# Patient Record
Sex: MALE | Race: WHITE | NOT HISPANIC OR LATINO | Employment: FULL TIME | URBAN - METROPOLITAN AREA
[De-identification: names, ages, dates, MRNs, and addresses within clinical notes are randomized per-mention and may not be internally consistent; named-entity substitution may affect disease eponyms.]

---

## 2017-01-10 ENCOUNTER — OFFICE VISIT (OUTPATIENT)
Dept: FAMILY MEDICINE CLINIC | Facility: CLINIC | Age: 32
End: 2017-01-10

## 2017-01-10 VITALS
SYSTOLIC BLOOD PRESSURE: 114 MMHG | TEMPERATURE: 98 F | OXYGEN SATURATION: 97 % | RESPIRATION RATE: 16 BRPM | DIASTOLIC BLOOD PRESSURE: 82 MMHG | BODY MASS INDEX: 28.45 KG/M2 | HEIGHT: 66 IN | HEART RATE: 79 BPM | WEIGHT: 177 LBS

## 2017-01-10 DIAGNOSIS — J06.9 ACUTE URI: Primary | ICD-10-CM

## 2017-01-10 PROCEDURE — 99213 OFFICE O/P EST LOW 20 MIN: CPT | Performed by: NURSE PRACTITIONER

## 2017-01-10 RX ORDER — PROMETHAZINE HYDROCHLORIDE AND CODEINE PHOSPHATE 6.25; 1 MG/5ML; MG/5ML
5 SYRUP ORAL EVERY 4 HOURS PRN
Qty: 180 ML | Refills: 0 | Status: SHIPPED | OUTPATIENT
Start: 2017-01-10 | End: 2018-04-17

## 2017-01-10 NOTE — MR AVS SNAPSHOT
Santino Rizzo   1/10/2017 9:40 AM   Office Visit    Dept Phone:  [phone]   Encounter #:  [id number]    Provider:  VITALIY Gutiérrez   Department:  [facility]                Your Full Care Plan              Today's Medication Changes          These changes are accurate as of: 1/10/17 11:16 AM.  If you have any questions, ask your nurse or doctor.               New Medication(s)Ordered:     promethazine-codeine 6.25-10 MG/5ML syrup   Commonly known as:  PHENERGAN with CODEINE   Take 5 mL by mouth Every 4 (Four) Hours As Needed for cough.   Started by:  VITALIY Gutiérrez            Where to Get Your Medications      You can get these medications from any pharmacy     Bring a paper prescription for each of these medications     promethazine-codeine 6.25-10 MG/5ML syrup                  Your Updated Medication List          This list is accurate as of: 1/10/17 11:16 AM.  Always use your most recent med list.                cetirizine 10 MG tablet   Commonly known as:  zyrTEC       promethazine-codeine 6.25-10 MG/5ML syrup   Commonly known as:  PHENERGAN with CODEINE   Take 5 mL by mouth Every 4 (Four) Hours As Needed for cough.               You Were Diagnosed With        Codes Comments    Acute URI    -  Primary ICD-10-CM: J06.9  ICD-9-CM: 465.9       Instructions     None    Patient Instructions History      Upcoming Appointments     Visit Type Date Time Department    SAME DAY 1/10/2017  9:40 AM [facility]      [employer] Signup     Our records indicate that you have an active [employer] account.    You can view your After Visit Summary by going to [employer] and logging in with your [employer] username and password.  If you don't have a [employer] username and password but a parent or guardian has access to your record, the parent or guardian should login with their own [employer] username and password and access  "your record to view the After Visit Summary.    If you have questions, you can email [email or web address] or call [phone] to talk to our MyChart staff.  Remember, [email or web address] is NOT to be used for urgent needs.  For medical emergencies, dial 911.               Other Info from Your Visit           Allergies     No Known Allergies      Reason for Visit     Cough pt states mucus green     Nasal Congestion           Vital Signs     Blood Pressure Pulse Temperature Respirations Height Weight    114/82 79 98 °F (36.7 °C) (Oral) 16 66\" (167.6 cm) 177 lb (80.3 kg)    Oxygen Saturation Body Mass Index Smoking Status             97% 28.57 kg/m2 Never Smoker         Problems and Diagnoses Noted     Acute upper respiratory infection    -  Primary        "

## 2017-01-10 NOTE — PROGRESS NOTES
Subjective   Santino Rizzo is a 31 y.o. male.   Chief Complaint   Patient presents with   • Cough     pt states mucus green    • Nasal Congestion     Vitals:    01/10/17 0929   BP: 114/82   Pulse: 79   Resp: 16   Temp: 98 °F (36.7 °C)   SpO2: 97%     No LMP for male patient.    History of Present Illness  Santino is here for an acute visit. He c/o productive cough with occasional sputum, sore throat, and nasal congestion that started a week ago. He denies fever, chills, or body aches. He has taken nyquil otc with some relief. Overall he feels better but he has had a persistent cough which has caused him to have some heartburn. He has not taken anything for the heartburn symptoms. He denies CP, or SOA     The following portions of the patient's history were reviewed and updated as appropriate: allergies, current medications, past family history, past medical history, past social history, past surgical history and problem list.    Review of Systems   Constitutional: Negative for chills, fatigue and fever.   HENT: Positive for congestion, postnasal drip, rhinorrhea and sore throat.    Respiratory: Positive for cough (occasionally productive ). Negative for chest tightness, shortness of breath and wheezing.    Cardiovascular: Negative for chest pain and palpitations.   Gastrointestinal:        Heartburn.        Objective   Physical Exam   Constitutional: Vital signs are normal. He appears well-developed and well-nourished. No distress.   HENT:   Right Ear: Tympanic membrane and ear canal normal.   Left Ear: Tympanic membrane and ear canal normal.   Nose: Mucosal edema and rhinorrhea present.   Mouth/Throat: Uvula is midline. Posterior oropharyngeal erythema present.   Cardiovascular: Normal rate and regular rhythm.    Pulmonary/Chest: Effort normal and breath sounds normal.   Neurological: He is alert.   Skin: Skin is warm and dry.   Psychiatric: He has a normal mood and affect. His speech is normal and behavior is  normal. Cognition and memory are normal.       Assessment/Plan   Santino was seen today for cough and nasal congestion.    Diagnoses and all orders for this visit:    Acute URI    Other orders  -     promethazine-codeine (PHENERGAN with CODEINE) 6.25-10 MG/5ML syrup; Take 5 mL by mouth Every 4 (Four) Hours As Needed for cough.      Symptom treatment for 7-10 days  Rest and fluids  Tylenol or motrin   Avoid second hand smoke and allergens   No driving while taking promethazine codeine as it can cause drowsiness   Throat lozenges, humidifier, vicks vapor rub as needed  Follow up if your symptoms persist past 7-10 days or sooner if your symptoms worsen or if you develop new symptoms

## 2018-04-17 ENCOUNTER — OFFICE VISIT (OUTPATIENT)
Dept: FAMILY MEDICINE CLINIC | Facility: CLINIC | Age: 33
End: 2018-04-17

## 2018-04-17 VITALS
HEART RATE: 83 BPM | BODY MASS INDEX: 28.28 KG/M2 | HEIGHT: 66 IN | RESPIRATION RATE: 18 BRPM | SYSTOLIC BLOOD PRESSURE: 124 MMHG | WEIGHT: 176 LBS | DIASTOLIC BLOOD PRESSURE: 80 MMHG

## 2018-04-17 DIAGNOSIS — M25.511 ACUTE PAIN OF BOTH SHOULDERS: Primary | ICD-10-CM

## 2018-04-17 DIAGNOSIS — M25.512 ACUTE PAIN OF BOTH SHOULDERS: Primary | ICD-10-CM

## 2018-04-17 PROCEDURE — 99213 OFFICE O/P EST LOW 20 MIN: CPT | Performed by: INTERNAL MEDICINE

## 2018-04-17 RX ORDER — MELOXICAM 7.5 MG/1
7.5 TABLET ORAL DAILY
Qty: 30 TABLET | Refills: 1 | Status: SHIPPED | OUTPATIENT
Start: 2018-04-17 | End: 2018-08-01

## 2018-04-17 NOTE — PROGRESS NOTES
Subjective   Santino Rizzo is a 33 y.o. male. Patient is here today for   Chief Complaint   Patient presents with   • Shoulder Pain     both shoulders. Left shoulder worse x 1 month - NKI           Vitals:    04/17/18 1429   BP: 124/80   Pulse: 83   Resp: 18     The following portions of the patient's history were reviewed and updated as appropriate: allergies, current medications, past family history, past medical history, past social history, past surgical history and problem list.    Past Medical History:   Diagnosis Date   • Environmental allergies    • Pilonidal cyst    • Spinal headache     NA      No Known Allergies   Social History     Social History   • Marital status: Single     Spouse name: N/A   • Number of children: N/A   • Years of education: N/A     Occupational History   •       Social History Main Topics   • Smoking status: Never Smoker   • Smokeless tobacco: Never Used   • Alcohol use 1.2 oz/week     2 Shots of liquor per week      Comment: moderate   • Drug use: No   • Sexual activity: Not on file     Other Topics Concern   • Not on file     Social History Narrative   • No narrative on file        Current Outpatient Prescriptions:   •  cetirizine (ZyrTEC) 10 MG tablet, Take 10 mg by mouth as needed for allergies., Disp: , Rfl:   •  meloxicam (MOBIC) 7.5 MG tablet, Take 1 tablet by mouth Daily., Disp: 30 tablet, Rfl: 1     Objective     History of Present Illness Santino complains of bilateral shoulder pain that worsened over the last month or so.  The pain is worse on the left.  He denies any injury.  He has pain when he lifts his arms above horizontal.  He plays volleyball and also has some sharp pains with certain movements.  He played baseball for a lot of years.  He does have some pain at night.  He has not taken any medicine to relieve the pain.    Review of Systems   Constitutional: Negative for activity change.   Musculoskeletal: Negative for neck pain.       Physical Exam    Constitutional: He appears well-developed and well-nourished.   Musculoskeletal:   Shoulder posture is good.  Both shoulders have normal range of motion.  There is some crepitus of the left acromioclavicular joint.  There are positive supraspinatus signs on the left.   Psychiatric: He has a normal mood and affect.   Vitals reviewed.      ASSESSMENT     Problem List Items Addressed This Visit        Nervous and Auditory    Acute pain of both shoulders - Primary      Other Visit Diagnoses    None.         PLAN  Patient Instructions   Start home physical therapy per instruction sheet for rotator cuff tendinitis.  Start meloxicam 7.5 mg daily with food.  Discussed potential GI side effects.  If symptoms do not improve we'll refer to physical therapy or orthopedic surgery.    Return if symptoms worsen or fail to improve.

## 2018-04-17 NOTE — PATIENT INSTRUCTIONS
Start home physical therapy per instruction sheet for rotator cuff tendinitis.  Start meloxicam 7.5 mg daily with food.  Discussed potential GI side effects.  If symptoms do not improve we'll refer to physical therapy or orthopedic surgery.

## 2018-08-01 ENCOUNTER — OFFICE VISIT (OUTPATIENT)
Dept: FAMILY MEDICINE CLINIC | Facility: CLINIC | Age: 33
End: 2018-08-01

## 2018-08-01 VITALS
BODY MASS INDEX: 27 KG/M2 | HEART RATE: 62 BPM | DIASTOLIC BLOOD PRESSURE: 70 MMHG | SYSTOLIC BLOOD PRESSURE: 114 MMHG | WEIGHT: 168 LBS | RESPIRATION RATE: 18 BRPM | HEIGHT: 66 IN

## 2018-08-01 DIAGNOSIS — S46.812A STRAIN OF LEFT TRAPEZIUS MUSCLE, INITIAL ENCOUNTER: ICD-10-CM

## 2018-08-01 DIAGNOSIS — M54.2 NECK PAIN ON LEFT SIDE: Primary | ICD-10-CM

## 2018-08-01 PROCEDURE — 99213 OFFICE O/P EST LOW 20 MIN: CPT | Performed by: INTERNAL MEDICINE

## 2018-08-01 RX ORDER — METAXALONE 800 MG/1
800 TABLET ORAL 3 TIMES DAILY
Qty: 30 TABLET | Refills: 0 | Status: SHIPPED | OUTPATIENT
Start: 2018-08-01 | End: 2018-09-05

## 2018-08-01 NOTE — PROGRESS NOTES
Subjective   Santino Rizzo is a 33 y.o. male. Patient is here today for   Chief Complaint   Patient presents with   • Neck Pain     hitting at batting cage, possible pulled muscle           Vitals:    08/01/18 1127   BP: 114/70   Pulse: 62   Resp: 18     The following portions of the patient's history were reviewed and updated as appropriate: allergies, current medications, past family history, past medical history, past social history, past surgical history and problem list.    Past Medical History:   Diagnosis Date   • Environmental allergies    • Pilonidal cyst    • Spinal headache     NA      No Known Allergies   Social History     Social History   • Marital status: Single     Spouse name: N/A   • Number of children: N/A   • Years of education: N/A     Occupational History   •       Social History Main Topics   • Smoking status: Never Smoker   • Smokeless tobacco: Never Used   • Alcohol use 1.2 oz/week     2 Shots of liquor per week      Comment: moderate   • Drug use: No   • Sexual activity: Not on file     Other Topics Concern   • Not on file     Social History Narrative   • No narrative on file        Current Outpatient Prescriptions:   •  cetirizine (ZyrTEC) 10 MG tablet, Take 10 mg by mouth as needed for allergies., Disp: , Rfl:   •  metaxalone (SKELAXIN) 800 MG tablet, Take 1 tablet by mouth 3 (Three) Times a Day., Disp: 30 tablet, Rfl: 0     Objective     History of Present Illness Santino complains of left sided upper back pain that started after he was batting and a batting cage.  The pain was sharp and initially quite severe.  The pain is worse with certain head movements and shoulder movements.  He denies neck pain or any radicular symptoms.  He denies any arm weakness or numbness.  He is having a lot of pain at night and has having difficulty sleeping.  He has been taking ibuprofen.    Review of Systems   Constitutional: Positive for activity change.   Musculoskeletal: Positive for back  pain. Negative for neck pain.   Neurological: Negative for weakness and numbness.       Physical Exam   Constitutional: He appears well-developed and well-nourished.   Musculoskeletal:   There is point tenderness of the left trapezius muscle medial to the left scapula.   Neurological: He displays normal reflexes.   Skin: No rash noted.   Psychiatric: He has a normal mood and affect. His behavior is normal. Judgment and thought content normal.   Vitals reviewed.      ASSESSMENT     Problem List Items Addressed This Visit        Nervous and Auditory    RESOLVED: Neck pain on left side - Primary       Musculoskeletal and Integument    Strain of left trapezius muscle          PLAN  Patient Instructions   Symptoms and examination are consistent with trapezius or paraspinal muscle strain.  Suggest rest and moist heat.  Do neck range of motion exercises as well as some trapezius exercises.  Try Skelaxin 800 mg 3 times a day for a few days.  If no better we will refer to physical therapy.    No Follow-up on file.

## 2018-09-05 ENCOUNTER — OFFICE VISIT (OUTPATIENT)
Dept: FAMILY MEDICINE CLINIC | Facility: CLINIC | Age: 33
End: 2018-09-05

## 2018-09-05 VITALS
RESPIRATION RATE: 18 BRPM | DIASTOLIC BLOOD PRESSURE: 70 MMHG | HEART RATE: 69 BPM | WEIGHT: 168 LBS | BODY MASS INDEX: 27 KG/M2 | SYSTOLIC BLOOD PRESSURE: 122 MMHG | HEIGHT: 66 IN | TEMPERATURE: 98 F | OXYGEN SATURATION: 98 %

## 2018-09-05 DIAGNOSIS — H92.01 RIGHT EAR PAIN: Primary | ICD-10-CM

## 2018-09-05 DIAGNOSIS — H66.90 ACUTE OTITIS MEDIA, UNSPECIFIED OTITIS MEDIA TYPE: ICD-10-CM

## 2018-09-05 PROCEDURE — 99213 OFFICE O/P EST LOW 20 MIN: CPT | Performed by: INTERNAL MEDICINE

## 2018-09-05 RX ORDER — AZITHROMYCIN 250 MG/1
TABLET, FILM COATED ORAL
Qty: 6 TABLET | Refills: 0 | Status: SHIPPED | OUTPATIENT
Start: 2018-09-05 | End: 2018-09-28

## 2018-09-05 NOTE — PATIENT INSTRUCTIONS
Continue cetirizine 10 mg daily and take pseudoephedrine 30 mg decongestant tablets every 4-6 hours as needed.  Start azithromycin as instructed.

## 2018-09-05 NOTE — PROGRESS NOTES
Subjective   Santino Rizzo is a 33 y.o. male. Patient is here today for   Chief Complaint   Patient presents with   • Earache   • Cough          Vitals:    09/05/18 0837   BP: 122/70   Pulse: 69   Resp: 18   Temp: 98 °F (36.7 °C)   SpO2: 98%     The following portions of the patient's history were reviewed and updated as appropriate: allergies, current medications, past family history, past medical history, past social history, past surgical history and problem list.    Past Medical History:   Diagnosis Date   • Environmental allergies    • Pilonidal cyst    • Spinal headache     NA      No Known Allergies   Social History     Social History   • Marital status: Single     Spouse name: N/A   • Number of children: N/A   • Years of education: N/A     Occupational History   •       Social History Main Topics   • Smoking status: Never Smoker   • Smokeless tobacco: Never Used   • Alcohol use 1.2 oz/week     2 Shots of liquor per week      Comment: moderate   • Drug use: No   • Sexual activity: Not on file     Other Topics Concern   • Not on file     Social History Narrative   • No narrative on file        Current Outpatient Prescriptions:   •  cetirizine (ZyrTEC) 10 MG tablet, Take 10 mg by mouth as needed for allergies., Disp: , Rfl:   •  azithromycin (ZITHROMAX) 250 MG tablet, Take 2 tablets the first day, then 1 tablet daily for 4 days., Disp: 6 tablet, Rfl: 0     Objective     History of Present Illness Santino complains of right ear pain that started a couple days ago but got worse last night.  He has allergic rhinitis and is on cetirizine 10 mg daily.  He denies any fever.  He also has nasal and sinus congestion and a slight cough.    Review of Systems   Constitutional: Negative for fatigue and fever.   HENT: Positive for ear pain and sinus pressure. Negative for sore throat.    Respiratory: Positive for cough. Negative for shortness of breath and wheezing.        Physical Exam   Constitutional: He appears  well-developed and well-nourished.   HENT:   Right Ear: Ear canal normal. Tympanic membrane is erythematous and bulging. Tympanic membrane mobility is abnormal.   Left Ear: Ear canal normal. Tympanic membrane is not bulging.   Nose: Mucosal edema (on right) present.   Mouth/Throat: Oropharynx is clear and moist. No oropharyngeal exudate.   Pulmonary/Chest: Effort normal and breath sounds normal.   Lymphadenopathy:     He has no cervical adenopathy.   Psychiatric: He has a normal mood and affect. His behavior is normal. Judgment and thought content normal.   Vitals reviewed.      ASSESSMENT     Problem List Items Addressed This Visit        Nervous and Auditory    Right ear pain - Primary    Acute otitis media          PLAN  Patient Instructions   Continue cetirizine 10 mg daily and take pseudoephedrine 30 mg decongestant tablets every 4-6 hours as needed.  Start azithromycin as instructed.    Return if symptoms worsen or fail to improve.

## 2018-09-28 ENCOUNTER — OFFICE VISIT (OUTPATIENT)
Dept: FAMILY MEDICINE CLINIC | Facility: CLINIC | Age: 33
End: 2018-09-28

## 2018-09-28 VITALS
RESPIRATION RATE: 18 BRPM | WEIGHT: 161 LBS | HEART RATE: 80 BPM | BODY MASS INDEX: 25.88 KG/M2 | SYSTOLIC BLOOD PRESSURE: 128 MMHG | HEIGHT: 66 IN | DIASTOLIC BLOOD PRESSURE: 90 MMHG

## 2018-09-28 DIAGNOSIS — N48.89 SEBACEOUS CYST OF PENIS: Primary | ICD-10-CM

## 2018-09-28 PROCEDURE — 99213 OFFICE O/P EST LOW 20 MIN: CPT | Performed by: INTERNAL MEDICINE

## 2018-09-28 NOTE — PATIENT INSTRUCTIONS
Removed sebaceous material and pus from cyst with pressure and dressed with triple antibiotic ointment.

## 2018-09-28 NOTE — PROGRESS NOTES
Subjective   Santino Rizzo is a 33 y.o. male. Patient is here today for   Chief Complaint   Patient presents with   • Bump Penis     x 1 week           Vitals:    09/28/18 1349   BP: 128/90   Pulse: 80   Resp: 18     The following portions of the patient's history were reviewed and updated as appropriate: allergies, current medications, past family history, past medical history, past social history, past surgical history and problem list.    Past Medical History:   Diagnosis Date   • Environmental allergies    • Pilonidal cyst    • Spinal headache     NA      No Known Allergies   Social History     Social History   • Marital status: Single     Spouse name: N/A   • Number of children: N/A   • Years of education: N/A     Occupational History   •       Social History Main Topics   • Smoking status: Never Smoker   • Smokeless tobacco: Never Used   • Alcohol use 1.2 oz/week     2 Shots of liquor per week      Comment: moderate   • Drug use: No   • Sexual activity: Not on file     Other Topics Concern   • Not on file     Social History Narrative   • No narrative on file        Current Outpatient Prescriptions:   •  cetirizine (ZyrTEC) 10 MG tablet, Take 10 mg by mouth as needed for allergies., Disp: , Rfl:      Objective     History of Present Illness Santino noticed a bump on the shaft of his penis about a week ago.  He became tender a couple days ago.  It drained today.    Review of Systems   Constitutional: Negative.    Skin:        As in history of present illness       Physical Exam   Constitutional: He appears well-developed and well-nourished.   Skin:   0.75 cm inflamed sebaceous cyst shaft of penis that is draining.   Psychiatric: He has a normal mood and affect.   Vitals reviewed.      ASSESSMENT     Problem List Items Addressed This Visit        Musculoskeletal and Integument    Sebaceous cyst of penis - Primary          PLAN  Patient Instructions   Removed sebaceous material and pus from cyst with  pressure and dressed with triple antibiotic ointment.    Return if symptoms worsen or fail to improve.

## 2019-02-13 ENCOUNTER — OFFICE VISIT (OUTPATIENT)
Dept: FAMILY MEDICINE CLINIC | Facility: CLINIC | Age: 34
End: 2019-02-13

## 2019-02-13 VITALS
BODY MASS INDEX: 28.54 KG/M2 | OXYGEN SATURATION: 98 % | SYSTOLIC BLOOD PRESSURE: 120 MMHG | WEIGHT: 177.6 LBS | DIASTOLIC BLOOD PRESSURE: 84 MMHG | HEIGHT: 66 IN | HEART RATE: 84 BPM | TEMPERATURE: 98.6 F | RESPIRATION RATE: 18 BRPM

## 2019-02-13 DIAGNOSIS — R05.9 COUGH: ICD-10-CM

## 2019-02-13 DIAGNOSIS — J06.9 ACUTE URI: Primary | ICD-10-CM

## 2019-02-13 PROBLEM — R40.0 SOMNOLENCE: Status: ACTIVE | Noted: 2019-02-13

## 2019-02-13 PROBLEM — L72.3 SEBACEOUS CYST: Status: ACTIVE | Noted: 2019-02-13

## 2019-02-13 PROBLEM — J30.2 SEASONAL ALLERGIC RHINITIS: Status: ACTIVE | Noted: 2019-02-13

## 2019-02-13 PROBLEM — R51.9 HEADACHE: Status: ACTIVE | Noted: 2019-02-13

## 2019-02-13 PROBLEM — E78.5 HYPERLIPIDEMIA: Status: ACTIVE | Noted: 2019-02-13

## 2019-02-13 PROBLEM — M25.539 PAIN IN WRIST: Status: ACTIVE | Noted: 2019-02-13

## 2019-02-13 PROBLEM — H69.90 DYSFUNCTION OF EUSTACHIAN TUBE: Status: ACTIVE | Noted: 2019-02-13

## 2019-02-13 PROBLEM — H69.80 DYSFUNCTION OF EUSTACHIAN TUBE: Status: ACTIVE | Noted: 2019-02-13

## 2019-02-13 PROBLEM — J02.9 SORE THROAT: Status: ACTIVE | Noted: 2019-02-13

## 2019-02-13 PROBLEM — H93.8X9 EAR POPPING: Status: ACTIVE | Noted: 2019-02-13

## 2019-02-13 PROBLEM — H93.8X9 SENSATION OF PLUGGED EAR: Status: ACTIVE | Noted: 2019-02-13

## 2019-02-13 PROBLEM — S66.919A: Status: ACTIVE | Noted: 2019-02-13

## 2019-02-13 PROBLEM — R06.7 SNEEZING: Status: ACTIVE | Noted: 2019-02-13

## 2019-02-13 LAB
EXPIRATION DATE: NORMAL
FLUAV AG NPH QL: NEGATIVE
FLUBV AG NPH QL: NEGATIVE
INTERNAL CONTROL: NORMAL
Lab: NORMAL

## 2019-02-13 PROCEDURE — 87804 INFLUENZA ASSAY W/OPTIC: CPT | Performed by: NURSE PRACTITIONER

## 2019-02-13 PROCEDURE — 99213 OFFICE O/P EST LOW 20 MIN: CPT | Performed by: NURSE PRACTITIONER

## 2019-02-13 NOTE — PROGRESS NOTES
Subjective   Santino Rizzo is a 33 y.o. male.   Chief Complaint   Patient presents with   • Cough     has been going on for a couple of days    • Generalized Body Aches   • Nasal Congestion   • Sore Throat     Vitals:    02/13/19 1438   BP: 120/84   Pulse: 84   Resp: 18   Temp: 98.6 °F (37 °C)   SpO2: 98%     No LMP for male patient.    santino is a patient of Dr Perales who is here for an acute visit.       URI    This is a new problem. The current episode started yesterday. The problem has been unchanged. There has been no fever. Associated symptoms include congestion, coughing, rhinorrhea and a sore throat. Treatments tried: nyquil  The treatment provided mild relief.    He did not get a flu vaccine     The following portions of the patient's history were reviewed and updated as appropriate: allergies, current medications, past family history, past medical history, past social history, past surgical history and problem list.    Review of Systems   Constitutional: Negative for chills, fatigue and fever.   HENT: Positive for congestion, rhinorrhea and sore throat.    Respiratory: Positive for cough.    Cardiovascular: Negative.    Musculoskeletal: Positive for arthralgias.       Objective   Physical Exam   Constitutional: Vital signs are normal. He appears well-developed and well-nourished. He appears ill. No distress.   HENT:   Head: Normocephalic.   Right Ear: Tympanic membrane and ear canal normal.   Left Ear: Tympanic membrane and ear canal normal.   Nose: Mucosal edema and rhinorrhea present.   Mouth/Throat: Uvula is midline. Posterior oropharyngeal erythema present.   Cardiovascular: Normal rate, regular rhythm and normal heart sounds.   Neurological: He is alert.   Skin: Skin is warm and dry.       Assessment/Plan   Santino was seen today for cough, generalized body aches, nasal congestion and sore throat.    Diagnoses and all orders for this visit:    Acute URI    Cough  -     POC Influenza A / B        Flu  swab negative  Symptom treatment for 7-10 days  Rest and fluids  Tylenol or motrin   Avoid second hand smoke and allergens   Throat lozenges, humidifier, vicks vapor rub as needed  Follow up if your symptoms persist past 7-10 days or sooner if your symptoms worsen or if you develop new symptoms

## 2019-12-06 ENCOUNTER — OFFICE VISIT (OUTPATIENT)
Dept: FAMILY MEDICINE CLINIC | Facility: CLINIC | Age: 34
End: 2019-12-06

## 2019-12-06 VITALS
OXYGEN SATURATION: 98 % | HEIGHT: 66 IN | TEMPERATURE: 98.9 F | HEART RATE: 74 BPM | DIASTOLIC BLOOD PRESSURE: 84 MMHG | SYSTOLIC BLOOD PRESSURE: 132 MMHG | WEIGHT: 176 LBS | BODY MASS INDEX: 28.28 KG/M2

## 2019-12-06 DIAGNOSIS — J06.9 VIRAL UPPER RESPIRATORY TRACT INFECTION: Primary | ICD-10-CM

## 2019-12-06 DIAGNOSIS — J02.9 SORE THROAT: ICD-10-CM

## 2019-12-06 LAB
EXPIRATION DATE: NORMAL
INTERNAL CONTROL: NORMAL
Lab: NORMAL
S PYO AG THROAT QL: NEGATIVE

## 2019-12-06 PROCEDURE — 99213 OFFICE O/P EST LOW 20 MIN: CPT | Performed by: INTERNAL MEDICINE

## 2019-12-06 PROCEDURE — 87880 STREP A ASSAY W/OPTIC: CPT | Performed by: INTERNAL MEDICINE

## 2019-12-06 NOTE — PATIENT INSTRUCTIONS
Strep test today is negative.  You have a viral upper respiratory infection.  Drink plenty of fluids and take pseudoephedrine 30 mg decongestant tablets during the day for congestion and I also suggest Afrin nasal spray at bedtime for nighttime congestion.  Stop taking Zyrtec.

## 2019-12-06 NOTE — PROGRESS NOTES
Subjective   Santino Rizzo is a 34 y.o. male. Patient is here today for   Chief Complaint   Patient presents with   • URI     Sneezing, Cough, Watery Eyes          Vitals:    12/06/19 0821   BP: 132/84   Pulse: 74   Temp: 98.9 °F (37.2 °C)   SpO2: 98%     Body mass index is 28.41 kg/m².    The following portions of the patient's history were reviewed and updated as appropriate: allergies, current medications, past family history, past medical history, past social history, past surgical history and problem list.    Past Medical History:   Diagnosis Date   • Environmental allergies    • Pilonidal cyst    • Spinal headache     NA      No Known Allergies   Social History     Socioeconomic History   • Marital status: Single     Spouse name: Not on file   • Number of children: Not on file   • Years of education: Not on file   • Highest education level: Not on file   Occupational History   • Occupation:    Tobacco Use   • Smoking status: Never Smoker   • Smokeless tobacco: Never Used   Substance and Sexual Activity   • Alcohol use: Yes     Alcohol/week: 1.2 oz     Types: 2 Shots of liquor per week     Comment: moderate   • Drug use: No        Current Outpatient Medications:   •  cetirizine (ZyrTEC) 10 MG tablet, Take 10 mg by mouth as needed for allergies., Disp: , Rfl:      Objective     History of Present Illness Santino complains of nasal congestion, sore throat, and cough that started 4 days ago.  A lot of people in his office have a similar illness.  He denies fever, wheezing, or shortness of breath.  He has been taking Zyrtec during the day and NyQuil at night.    Review of Systems   Constitutional: Negative for fever.   HENT: Positive for congestion and sore throat.    Respiratory: Positive for cough. Negative for shortness of breath.    Cardiovascular: Negative.    Neurological: Positive for headaches.       Physical Exam   Constitutional: He appears well-developed and well-nourished.   HENT:   Right Ear:  Tympanic membrane normal.   Left Ear: Tympanic membrane normal.   Nose: Mucosal edema present.   Mouth/Throat: Posterior oropharyngeal erythema present. No oropharyngeal exudate.   Pulmonary/Chest: Effort normal and breath sounds normal.   Lymphadenopathy:     He has no cervical adenopathy.   Neurological: He is alert.   Psychiatric: He has a normal mood and affect. His behavior is normal. Judgment and thought content normal.   Vitals reviewed.      ASSESSMENT     Problem List Items Addressed This Visit        Respiratory    Sore throat    Relevant Orders    POC Rapid Strep A (Completed)    Viral upper respiratory tract infection - Primary          PLAN  Patient Instructions   Strep test today is negative.  You have a viral upper respiratory infection.  Drink plenty of fluids and take pseudoephedrine 30 mg decongestant tablets during the day for congestion and I also suggest Afrin nasal spray at bedtime for nighttime congestion.  Stop taking Zyrtec.    Return if symptoms worsen or fail to improve.

## 2020-01-16 ENCOUNTER — OFFICE VISIT (OUTPATIENT)
Dept: FAMILY MEDICINE CLINIC | Facility: CLINIC | Age: 35
End: 2020-01-16

## 2020-01-16 VITALS
BODY MASS INDEX: 28.09 KG/M2 | HEART RATE: 69 BPM | HEIGHT: 66 IN | WEIGHT: 174.8 LBS | OXYGEN SATURATION: 98 % | SYSTOLIC BLOOD PRESSURE: 132 MMHG | DIASTOLIC BLOOD PRESSURE: 92 MMHG | RESPIRATION RATE: 16 BRPM | TEMPERATURE: 98.1 F

## 2020-01-16 DIAGNOSIS — I10 ESSENTIAL HYPERTENSION: Primary | ICD-10-CM

## 2020-01-16 PROCEDURE — 99213 OFFICE O/P EST LOW 20 MIN: CPT | Performed by: INTERNAL MEDICINE

## 2020-01-16 RX ORDER — LISINOPRIL 10 MG/1
10 TABLET ORAL DAILY
Qty: 30 TABLET | Refills: 1 | Status: SHIPPED | OUTPATIENT
Start: 2020-01-16 | End: 2020-02-18 | Stop reason: SDUPTHER

## 2020-01-16 NOTE — PROGRESS NOTES
Subjective   Santino Rizzo is a 34 y.o. male. Patient is here today for high blood pressure at home.  Patient's been feeling well and has no acute complaints but he has been monitoring his blood pressure at home and regularly its been in having systolics anywhere from 1 30-1 50 and diastolics  range.  He has a strong family history of hypertension.  He has had no other symptoms and denies any chest pain, shortness of breath or significant headaches.  He just uses alcohol moderately and does not add salt to his food.  Chief Complaint   Patient presents with   • Hypertension          Vitals:    01/16/20 0914   BP: 132/92   Pulse: 69   Resp: 16   Temp: 98.1 °F (36.7 °C)   SpO2: 98%     Body mass index is 28.23 kg/m².  The following portions of the patient's history were reviewed and updated as appropriate: allergies, current medications, past family history, past medical history, past social history, past surgical history and problem list.    Past Medical History:   Diagnosis Date   • Environmental allergies    • Pilonidal cyst    • Spinal headache     NA      No Known Allergies   Social History     Socioeconomic History   • Marital status: Single     Spouse name: Not on file   • Number of children: Not on file   • Years of education: Not on file   • Highest education level: Not on file   Occupational History   • Occupation:    Tobacco Use   • Smoking status: Never Smoker   • Smokeless tobacco: Never Used   Substance and Sexual Activity   • Alcohol use: Yes     Alcohol/week: 2.0 standard drinks     Types: 2 Shots of liquor per week     Comment: moderate   • Drug use: No        Current Outpatient Medications:   •  cetirizine (ZyrTEC) 10 MG tablet, Take 10 mg by mouth as needed for allergies., Disp: , Rfl:   •  lisinopril (PRINIVIL,ZESTRIL) 10 MG tablet, Take 1 tablet by mouth Daily., Disp: 30 tablet, Rfl: 1     Objective     History of Present Illness     Review of Systems   Constitutional: Negative.     HENT: Negative.    Eyes: Negative.    Respiratory: Negative.    Cardiovascular: Negative.    Gastrointestinal: Negative.    Genitourinary: Negative.    Musculoskeletal: Negative.    Skin: Negative.    Neurological: Negative.    Psychiatric/Behavioral: Negative.        Physical Exam   Constitutional: He is oriented to person, place, and time. He appears well-developed and well-nourished.   Pleasant, cooperative and healthy appearing and not overweight with blood pressure in the left arm 130/90 and in the right 150/90   HENT:   Head: Normocephalic and atraumatic.   Eyes: Pupils are equal, round, and reactive to light. Conjunctivae are normal. No scleral icterus.   Neck: Normal range of motion. Neck supple.   Cardiovascular: Normal rate, regular rhythm and normal heart sounds.   Pulmonary/Chest: Effort normal and breath sounds normal. No respiratory distress. He has no wheezes. He has no rales.   Neurological: He is alert and oriented to person, place, and time.   Skin: Skin is warm and dry.   Psychiatric: He has a normal mood and affect. His behavior is normal.   Nursing note and vitals reviewed.      ASSESSMENT patient's blood pressure was somewhat elevated especially in the right arm today and home readings consistently have been high so I believe he is probably developed hypertension.     Problem List Items Addressed This Visit        Cardiovascular and Mediastinum    Essential hypertension - Primary    Relevant Medications    lisinopril (PRINIVIL,ZESTRIL) 10 MG tablet          PLAN I am starting the patient on lisinopril 10 mg daily and the patient will continue to monitor his blood pressure at home.  I want him to recheck in about a month with Dr. Perales with a CBC, CMP, lipid panel and TSH    There are no Patient Instructions on file for this visit.  Return in about 1 month (around 2/16/2020).

## 2020-02-18 ENCOUNTER — OFFICE VISIT (OUTPATIENT)
Dept: FAMILY MEDICINE CLINIC | Facility: CLINIC | Age: 35
End: 2020-02-18

## 2020-02-18 VITALS
WEIGHT: 172 LBS | SYSTOLIC BLOOD PRESSURE: 134 MMHG | TEMPERATURE: 98.1 F | OXYGEN SATURATION: 98 % | BODY MASS INDEX: 27.64 KG/M2 | HEIGHT: 66 IN | RESPIRATION RATE: 16 BRPM | DIASTOLIC BLOOD PRESSURE: 80 MMHG | HEART RATE: 84 BPM

## 2020-02-18 DIAGNOSIS — I10 ESSENTIAL HYPERTENSION: Primary | ICD-10-CM

## 2020-02-18 PROCEDURE — 99213 OFFICE O/P EST LOW 20 MIN: CPT | Performed by: INTERNAL MEDICINE

## 2020-02-18 RX ORDER — LISINOPRIL 10 MG/1
10 TABLET ORAL DAILY
Qty: 30 TABLET | Refills: 6 | Status: SHIPPED | OUTPATIENT
Start: 2020-02-18 | End: 2020-02-18 | Stop reason: SDUPTHER

## 2020-02-18 RX ORDER — LISINOPRIL 10 MG/1
10 TABLET ORAL DAILY
Qty: 90 TABLET | Refills: 3 | Status: SHIPPED | OUTPATIENT
Start: 2020-02-18 | End: 2021-04-29

## 2020-02-18 NOTE — PATIENT INSTRUCTIONS
Blood pressure is high today.  Will restart lisinopril 10 mg daily.  Continue to monitor blood pressure correctly at home when rested and relaxed as instructed.  Blood pressure appears to be well controlled on the medicine.  Continue diet.  Discussed importance of a cardiovascular exercise program.

## 2020-02-18 NOTE — PROGRESS NOTES
Subjective   Santino Rizzo is a 34 y.o. male. Patient is here today for   Chief Complaint   Patient presents with   • Hypertension     1 MONTH FOLLOW UP          Vitals:    02/18/20 1544   BP: 134/80   Pulse: 84   Resp: 16   Temp: 98.1 °F (36.7 °C)   SpO2: 98%     Body mass index is 27.76 kg/m².    The following portions of the patient's history were reviewed and updated as appropriate: allergies, current medications, past family history, past medical history, past social history, past surgical history and problem list.    Past Medical History:   Diagnosis Date   • Environmental allergies    • Pilonidal cyst    • Spinal headache     NA      No Known Allergies   Social History     Socioeconomic History   • Marital status: Single     Spouse name: Not on file   • Number of children: Not on file   • Years of education: Not on file   • Highest education level: Not on file   Occupational History   • Occupation:    Tobacco Use   • Smoking status: Never Smoker   • Smokeless tobacco: Never Used   Substance and Sexual Activity   • Alcohol use: Yes     Alcohol/week: 2.0 standard drinks     Types: 2 Shots of liquor per week     Comment: moderate   • Drug use: No   • Sexual activity: Defer        Current Outpatient Medications:   •  cetirizine (ZyrTEC) 10 MG tablet, Take 10 mg by mouth as needed for allergies., Disp: , Rfl:   •  lisinopril (PRINIVIL,ZESTRIL) 10 MG tablet, Take 1 tablet by mouth Daily., Disp: 90 tablet, Rfl: 3     Objective     History of Present Illness Santino is here for blood pressure follow-up.  He was seen 1 month ago and diagnosed with hypertension and started on lisinopril 10 mg daily.  Since then he has been monitoring his blood pressure and reports normal readings.  He ran out of lisinopril 3 or 4 days ago.  He continues eat healthy and does exercise some but more strength training and not cardiovascular exercise.  He does play volleyball a few days a week this time a year.  His weight is been  stable.  His cholesterols been high in the past and has not been checked recently.    Review of Systems   Constitutional: Negative for activity change and unexpected weight change.   Respiratory: Negative.    Cardiovascular:        BP <120/80   Genitourinary: Negative.    Neurological: Negative for light-headedness and headaches.       Physical Exam   Constitutional: He appears well-developed and well-nourished.   Cardiovascular: Normal rate, regular rhythm and normal heart sounds.   132/82, 134/82   Musculoskeletal: He exhibits no edema.   Psychiatric: He has a normal mood and affect. His behavior is normal. Judgment and thought content normal.   Vitals reviewed.      ASSESSMENT     Problem List Items Addressed This Visit        Cardiovascular and Mediastinum    Essential hypertension - Primary    Relevant Medications    lisinopril (PRINIVIL,ZESTRIL) 10 MG tablet          PLAN  Patient Instructions   Blood pressure is high today.  Will restart lisinopril 10 mg daily.  Continue to monitor blood pressure correctly at home when rested and relaxed as instructed.  Blood pressure appears to be well controlled on the medicine.  Continue diet.  Discussed importance of a cardiovascular exercise program.    Return in about 4 months (around 6/18/2020) for labs CBC,CMP,LIPD,TSH.UA,EKG.

## 2020-06-29 DIAGNOSIS — I10 ESSENTIAL HYPERTENSION: Primary | ICD-10-CM

## 2020-06-29 DIAGNOSIS — E78.5 HYPERLIPIDEMIA, UNSPECIFIED HYPERLIPIDEMIA TYPE: ICD-10-CM

## 2020-07-11 LAB
ALBUMIN SERPL-MCNC: 5 G/DL (ref 3.5–5.2)
ALBUMIN/GLOB SERPL: 2.8 G/DL
ALP SERPL-CCNC: 50 U/L (ref 39–117)
ALT SERPL-CCNC: 30 U/L (ref 1–41)
AST SERPL-CCNC: 15 U/L (ref 1–40)
BASOPHILS # BLD AUTO: 0.04 10*3/MM3 (ref 0–0.2)
BASOPHILS NFR BLD AUTO: 0.8 % (ref 0–1.5)
BILIRUB SERPL-MCNC: 0.3 MG/DL (ref 0–1.2)
BUN SERPL-MCNC: 14 MG/DL (ref 6–20)
BUN/CREAT SERPL: 11.2 (ref 7–25)
CALCIUM SERPL-MCNC: 9.2 MG/DL (ref 8.6–10.5)
CHLORIDE SERPL-SCNC: 100 MMOL/L (ref 98–107)
CHOLEST SERPL-MCNC: 222 MG/DL (ref 0–200)
CO2 SERPL-SCNC: 30.9 MMOL/L (ref 22–29)
CREAT SERPL-MCNC: 1.25 MG/DL (ref 0.76–1.27)
EOSINOPHIL # BLD AUTO: 0.17 10*3/MM3 (ref 0–0.4)
EOSINOPHIL NFR BLD AUTO: 3.5 % (ref 0.3–6.2)
ERYTHROCYTE [DISTWIDTH] IN BLOOD BY AUTOMATED COUNT: 12.4 % (ref 12.3–15.4)
GLOBULIN SER CALC-MCNC: 1.8 GM/DL
GLUCOSE SERPL-MCNC: 95 MG/DL (ref 65–99)
HCT VFR BLD AUTO: 42.9 % (ref 37.5–51)
HDLC SERPL-MCNC: 35 MG/DL (ref 40–60)
HGB BLD-MCNC: 14.4 G/DL (ref 13–17.7)
IMM GRANULOCYTES # BLD AUTO: 0.03 10*3/MM3 (ref 0–0.05)
IMM GRANULOCYTES NFR BLD AUTO: 0.6 % (ref 0–0.5)
LDLC SERPL CALC-MCNC: 148 MG/DL (ref 0–100)
LDLC/HDLC SERPL: 4.22 {RATIO}
LYMPHOCYTES # BLD AUTO: 1.67 10*3/MM3 (ref 0.7–3.1)
LYMPHOCYTES NFR BLD AUTO: 34.5 % (ref 19.6–45.3)
MCH RBC QN AUTO: 29.3 PG (ref 26.6–33)
MCHC RBC AUTO-ENTMCNC: 33.6 G/DL (ref 31.5–35.7)
MCV RBC AUTO: 87.4 FL (ref 79–97)
MONOCYTES # BLD AUTO: 0.48 10*3/MM3 (ref 0.1–0.9)
MONOCYTES NFR BLD AUTO: 9.9 % (ref 5–12)
NEUTROPHILS # BLD AUTO: 2.45 10*3/MM3 (ref 1.7–7)
NEUTROPHILS NFR BLD AUTO: 50.7 % (ref 42.7–76)
NRBC BLD AUTO-RTO: 0 /100 WBC (ref 0–0.2)
PLATELET # BLD AUTO: 308 10*3/MM3 (ref 140–450)
POTASSIUM SERPL-SCNC: 4.1 MMOL/L (ref 3.5–5.2)
PROT SERPL-MCNC: 6.8 G/DL (ref 6–8.5)
RBC # BLD AUTO: 4.91 10*6/MM3 (ref 4.14–5.8)
SODIUM SERPL-SCNC: 140 MMOL/L (ref 136–145)
TRIGL SERPL-MCNC: 197 MG/DL (ref 0–150)
TSH SERPL DL<=0.005 MIU/L-ACNC: 2.55 UIU/ML (ref 0.27–4.2)
VLDLC SERPL CALC-MCNC: 39.4 MG/DL
WBC # BLD AUTO: 4.84 10*3/MM3 (ref 3.4–10.8)

## 2020-07-17 ENCOUNTER — OFFICE VISIT (OUTPATIENT)
Dept: FAMILY MEDICINE CLINIC | Facility: CLINIC | Age: 35
End: 2020-07-17

## 2020-07-17 VITALS
BODY MASS INDEX: 27.48 KG/M2 | SYSTOLIC BLOOD PRESSURE: 120 MMHG | TEMPERATURE: 99.3 F | HEIGHT: 66 IN | RESPIRATION RATE: 15 BRPM | OXYGEN SATURATION: 95 % | DIASTOLIC BLOOD PRESSURE: 70 MMHG | WEIGHT: 171 LBS | HEART RATE: 80 BPM

## 2020-07-17 DIAGNOSIS — E78.5 HYPERLIPIDEMIA, UNSPECIFIED HYPERLIPIDEMIA TYPE: ICD-10-CM

## 2020-07-17 DIAGNOSIS — I10 ESSENTIAL HYPERTENSION: Primary | ICD-10-CM

## 2020-07-17 PROCEDURE — 99213 OFFICE O/P EST LOW 20 MIN: CPT | Performed by: INTERNAL MEDICINE

## 2020-07-17 NOTE — PROGRESS NOTES
Subjective   Santino Rizzo is a 35 y.o. male. Patient is here today for   Chief Complaint   Patient presents with   • Hypertension          Vitals:    07/17/20 1541   BP: 120/70   Pulse: 80   Resp: 15   Temp: 99.3 °F (37.4 °C)   SpO2: 95%     Body mass index is 27.6 kg/m².      The following portions of the patient's history were reviewed and updated as appropriate: allergies, current medications, past family history, past medical history, past social history, past surgical history and problem list.    Past Medical History:   Diagnosis Date   • Environmental allergies    • Pilonidal cyst    • Spinal headache     NA      No Known Allergies   Social History     Socioeconomic History   • Marital status: Single     Spouse name: Not on file   • Number of children: Not on file   • Years of education: Not on file   • Highest education level: Not on file   Occupational History   • Occupation:    Tobacco Use   • Smoking status: Never Smoker   • Smokeless tobacco: Never Used   Substance and Sexual Activity   • Alcohol use: Yes     Alcohol/week: 2.0 standard drinks     Types: 2 Shots of liquor per week     Comment: moderate   • Drug use: No   • Sexual activity: Defer        Current Outpatient Medications:   •  lisinopril (PRINIVIL,ZESTRIL) 10 MG tablet, Take 1 tablet by mouth Daily., Disp: 90 tablet, Rfl: 3     Objective   History of Present Illness Santino is here for a blood pressure check and lab follow-up.  He has hypertension is on lisinopril 10 mg daily.  He also has hyperlipidemia.  He tries to eat healthy but does eat a lot of cheese.  He walks for exercise for 15 minutes at a time as he is working for home.  He does not really get any cardiovascular exercise.  His weight is down 5 pounds from last December.  He has not been monitoring his blood pressure.    Review of Systems   Constitutional: Negative for activity change and unexpected weight change.   Respiratory: Negative for cough and shortness of breath.     Cardiovascular: Negative.    Neurological: Negative.    Psychiatric/Behavioral: Negative.        Physical Exam   Constitutional: He appears well-developed and well-nourished.   Cardiovascular: Normal rate, regular rhythm and normal heart sounds.   122/60   Pulmonary/Chest: Effort normal and breath sounds normal.   Neurological: He is alert.   Psychiatric: He has a normal mood and affect. His behavior is normal. Judgment and thought content normal.   Vitals reviewed.      ASSESSMENT     Problem List Items Addressed This Visit        Cardiovascular and Mediastinum    Hyperlipidemia    Essential hypertension - Primary          PLAN  Patient Instructions   Blood pressure is stable but you need to monitor blood pressure correctly at home as instructed while rested and relaxed.  Goal is less than 120/80.  Total cholesterol, LDL cholesterol, and triglycerides are high and HDL cholesterol is low.  Fasting blood sugar, kidney functions, liver functions, thyroid-stimulating hormone level, and complete blood count are normal.  Discussed diet and doing cardiovascular exercise to raise HDL cholesterol and improve lipids.  Will continue lisinopril 10 mg daily.      Return in about 6 months (around 1/17/2021) for labsLIPID.

## 2020-07-17 NOTE — PATIENT INSTRUCTIONS
Blood pressure is stable but you need to monitor blood pressure correctly at home as instructed while rested and relaxed.  Goal is less than 120/80.  Total cholesterol, LDL cholesterol, and triglycerides are high and HDL cholesterol is low.  Fasting blood sugar, kidney functions, liver functions, thyroid-stimulating hormone level, and complete blood count are normal.  Discussed diet and doing cardiovascular exercise to raise HDL cholesterol and improve lipids.  Will continue lisinopril 10 mg daily.

## 2021-01-13 DIAGNOSIS — I10 ESSENTIAL HYPERTENSION: ICD-10-CM

## 2021-01-13 DIAGNOSIS — E78.5 HYPERLIPIDEMIA, UNSPECIFIED HYPERLIPIDEMIA TYPE: Primary | ICD-10-CM

## 2021-02-05 ENCOUNTER — OFFICE VISIT (OUTPATIENT)
Dept: FAMILY MEDICINE CLINIC | Facility: CLINIC | Age: 36
End: 2021-02-05

## 2021-02-05 VITALS
DIASTOLIC BLOOD PRESSURE: 80 MMHG | WEIGHT: 173 LBS | SYSTOLIC BLOOD PRESSURE: 130 MMHG | RESPIRATION RATE: 16 BRPM | BODY MASS INDEX: 27.8 KG/M2 | TEMPERATURE: 98.2 F | HEIGHT: 66 IN | OXYGEN SATURATION: 96 % | HEART RATE: 99 BPM

## 2021-02-05 DIAGNOSIS — E78.6 LOW HDL (UNDER 40): ICD-10-CM

## 2021-02-05 DIAGNOSIS — E78.5 HYPERLIPIDEMIA, UNSPECIFIED HYPERLIPIDEMIA TYPE: ICD-10-CM

## 2021-02-05 DIAGNOSIS — I10 ESSENTIAL HYPERTENSION: Primary | ICD-10-CM

## 2021-02-05 PROCEDURE — 99214 OFFICE O/P EST MOD 30 MIN: CPT | Performed by: INTERNAL MEDICINE

## 2021-02-05 NOTE — PATIENT INSTRUCTIONS
Blood pressure is normal when checked by me today.  You need to monitor your blood pressure periodically at home while resting relaxed as instructed.  Normal is less than 120/80.  Total and LDL cholesterol are high and HDL cholesterol is low.  Triglycerides are now normal.  Fasting glucose is minimally elevated.  Kidney functions and thyroid-stimulating hormone level are normal.  Discussed cardiovascular exercise per AHA guidelines as well as a high-fiber diet per fiber sheet.  RDA is 30 g daily.

## 2021-02-05 NOTE — PROGRESS NOTES
Subjective   Santino Rizzo is a 35 y.o. male. Patient is here today for   Chief Complaint   Patient presents with   • Hyperlipidemia     lab f/u   • Hypertension          Vitals:    02/05/21 1456   BP: 130/80   Pulse: 99   Resp: 16   Temp: 98.2 °F (36.8 °C)   SpO2: 96%     Body mass index is 27.92 kg/m².      The following portions of the patient's history were reviewed and updated as appropriate: allergies, current medications, past family history, past medical history, past social history, past surgical history and problem list.    Past Medical History:   Diagnosis Date   • Environmental allergies    • Pilonidal cyst    • Spinal headache     NA      No Known Allergies   Social History     Socioeconomic History   • Marital status: Single     Spouse name: Not on file   • Number of children: Not on file   • Years of education: Not on file   • Highest education level: Not on file   Occupational History   • Occupation:    Tobacco Use   • Smoking status: Never Smoker   • Smokeless tobacco: Never Used   Substance and Sexual Activity   • Alcohol use: Yes     Alcohol/week: 2.0 standard drinks     Types: 2 Shots of liquor per week     Comment: moderate   • Drug use: No   • Sexual activity: Defer        Current Outpatient Medications:   •  lisinopril (PRINIVIL,ZESTRIL) 10 MG tablet, Take 1 tablet by mouth Daily., Disp: 90 tablet, Rfl: 3     Objective   History of Present Illness Santino is here for blood pressure check and lab follow-up.  He has hypertension, elevated LDL cholesterol, and low HDL cholesterol.  He feels well.  He tries eat healthy and is physically active but does not exercise.  His weight is unchanged in the last 6 months.  He does not check his blood pressure.     Review of Systems   Constitutional: Negative for activity change and unexpected weight change.   Respiratory: Negative for cough and shortness of breath.    Cardiovascular: Negative.    Gastrointestinal: Negative.    Genitourinary:  Negative.    Neurological: Negative.    Psychiatric/Behavioral: Negative.        Physical Exam  Vitals signs reviewed.   Constitutional:       Appearance: Normal appearance.   Cardiovascular:      Rate and Rhythm: Normal rate and regular rhythm.      Heart sounds: Normal heart sounds.      Comments: 110/65  Pulmonary:      Effort: Pulmonary effort is normal.      Breath sounds: Normal breath sounds.   Musculoskeletal:      Right lower leg: No edema.      Left lower leg: No edema.   Neurological:      Mental Status: He is alert.   Psychiatric:         Mood and Affect: Mood normal.         Behavior: Behavior normal.         Thought Content: Thought content normal.         Judgment: Judgment normal.         ASSESSMENT     Problems Addressed this Visit        Cardiac and Vasculature    Hyperlipidemia    Essential hypertension - Primary      Diagnoses       Codes Comments    Essential hypertension    -  Primary ICD-10-CM: I10  ICD-9-CM: 401.9     Hyperlipidemia, unspecified hyperlipidemia type     ICD-10-CM: E78.5  ICD-9-CM: 272.4           PLAN  There are no Patient Instructions on file for this visit.    Return in about 6 months (around 8/5/2021) for labs lipid,ua,cmp.

## 2021-04-27 ENCOUNTER — OFFICE VISIT (OUTPATIENT)
Dept: FAMILY MEDICINE CLINIC | Facility: CLINIC | Age: 36
End: 2021-04-27

## 2021-04-27 ENCOUNTER — TELEPHONE (OUTPATIENT)
Dept: FAMILY MEDICINE CLINIC | Facility: CLINIC | Age: 36
End: 2021-04-27

## 2021-04-27 VITALS
OXYGEN SATURATION: 98 % | WEIGHT: 161.4 LBS | HEART RATE: 82 BPM | HEIGHT: 66 IN | TEMPERATURE: 98 F | BODY MASS INDEX: 25.94 KG/M2 | DIASTOLIC BLOOD PRESSURE: 70 MMHG | SYSTOLIC BLOOD PRESSURE: 122 MMHG | RESPIRATION RATE: 18 BRPM

## 2021-04-27 DIAGNOSIS — F41.9 ANXIETY: Primary | ICD-10-CM

## 2021-04-27 PROCEDURE — 99213 OFFICE O/P EST LOW 20 MIN: CPT | Performed by: INTERNAL MEDICINE

## 2021-04-27 RX ORDER — ESCITALOPRAM OXALATE 10 MG/1
10 TABLET ORAL DAILY
Qty: 30 TABLET | Refills: 5 | Status: SHIPPED | OUTPATIENT
Start: 2021-04-27 | End: 2022-03-17

## 2021-04-27 NOTE — PROGRESS NOTES
Subjective   Santino Rizzo is a 36 y.o. male. Patient is here today for   Chief Complaint   Patient presents with   • Anxiety     stress/ chest tightening          Vitals:    04/27/21 1418   BP: 122/70   Pulse: 82   Resp: 18   Temp: 98 °F (36.7 °C)   SpO2: 98%     Body mass index is 26.05 kg/m².    The following portions of the patient's history were reviewed and updated as appropriate: allergies, current medications, past family history, past medical history, past social history, past surgical history and problem list.    Past Medical History:   Diagnosis Date   • Environmental allergies    • Pilonidal cyst    • Spinal headache     NA      No Known Allergies   Social History     Socioeconomic History   • Marital status: Single     Spouse name: Not on file   • Number of children: Not on file   • Years of education: Not on file   • Highest education level: Not on file   Tobacco Use   • Smoking status: Never Smoker   • Smokeless tobacco: Never Used   Substance and Sexual Activity   • Alcohol use: Yes     Alcohol/week: 2.0 standard drinks     Types: 2 Shots of liquor per week     Comment: moderate   • Drug use: No   • Sexual activity: Defer        Current Outpatient Medications:   •  escitalopram (Lexapro) 10 MG tablet, Take 1 tablet by mouth Daily., Disp: 30 tablet, Rfl: 5  •  lisinopril (PRINIVIL,ZESTRIL) 10 MG tablet, Take 1 tablet by mouth Daily., Disp: 90 tablet, Rfl: 3     Objective     History of Present Illness Santino has a lot of work stress that has been going on for a while.  He works a lot of hours and sometimes 7 days a week.  He feels stressed has problems sleeping.  He has not been exercising as he has in the past.  Volleyball season has started.  He is being treated for hypertension and is on lisinopril 10 mg daily.    Review of Systems   Constitutional: Negative.    Respiratory: Negative for cough and shortness of breath.    Cardiovascular: Negative.    Psychiatric/Behavioral: Positive for sleep  disturbance. The patient is nervous/anxious.        Physical Exam  Vitals reviewed.   Constitutional:       Appearance: Normal appearance.   Cardiovascular:      Rate and Rhythm: Normal rate and regular rhythm.      Heart sounds: Normal heart sounds.   Pulmonary:      Effort: Pulmonary effort is normal.      Breath sounds: Normal breath sounds.   Neurological:      Mental Status: He is alert.   Psychiatric:         Mood and Affect: Mood normal.         Behavior: Behavior normal.         Thought Content: Thought content normal.         Judgment: Judgment normal.         ASSESSMENT     Problems Addressed this Visit        Mental Health    Anxiety - Primary      Diagnoses       Codes Comments    Anxiety    -  Primary ICD-10-CM: F41.9  ICD-9-CM: 300.00           PLAN  There are no Patient Instructions on file for this visit.  Return in about 1 month (around 5/27/2021) for Recheck.

## 2021-04-27 NOTE — TELEPHONE ENCOUNTER
PATIENT CALLED AND STATED HE WAS SEEN TODAY AND BELIEVES HE MIGHT HAVE GIVEN THE WRONG DATES FOR HIS COVID VACCINE. PATIENT HAD THE PFIZER VACCINE ON 03/14/21 AND 04/04/21    CALL BACK   [phone]

## 2021-04-27 NOTE — PATIENT INSTRUCTIONS
Discussed getting some counseling and resuming a vigorous daily exercise program for at least 30 minutes.  We will start S-Citalopram 5 mg daily for few days and increase to 10 mg.  We will follow-up in 1 month.

## 2021-04-29 RX ORDER — LISINOPRIL 10 MG/1
TABLET ORAL
Qty: 90 TABLET | Refills: 2 | Status: SHIPPED | OUTPATIENT
Start: 2021-04-29 | End: 2022-03-17 | Stop reason: SDUPTHER

## 2021-05-20 ENCOUNTER — OFFICE VISIT (OUTPATIENT)
Dept: FAMILY MEDICINE CLINIC | Facility: CLINIC | Age: 36
End: 2021-05-20

## 2021-05-20 VITALS
RESPIRATION RATE: 20 BRPM | DIASTOLIC BLOOD PRESSURE: 60 MMHG | BODY MASS INDEX: 24.99 KG/M2 | HEIGHT: 67 IN | SYSTOLIC BLOOD PRESSURE: 110 MMHG | OXYGEN SATURATION: 98 % | WEIGHT: 159.2 LBS | HEART RATE: 62 BPM | TEMPERATURE: 97.5 F

## 2021-05-20 DIAGNOSIS — R22.2 LUMP IN CHEST: Primary | ICD-10-CM

## 2021-05-20 PROCEDURE — 99213 OFFICE O/P EST LOW 20 MIN: CPT | Performed by: INTERNAL MEDICINE

## 2021-05-20 NOTE — PROGRESS NOTES
"Chief Complaint  Cyst (knot on sternal area x3 days ), Anxiety, and Hypertension    Subjective          Santino Rizzo presents to [facility]  History of Present Illness Santino feels a nonpainful lump at the bottom of the sternum that is never felt before.  He does play volleyball and lands on his chest frequently but he denies any injuries.    Objective   Vital Signs:   /60 (BP Location: Left arm, Patient Position: Sitting, Cuff Size: Adult)   Pulse 62   Temp 97.5 °F (36.4 °C) (Temporal)   Resp 20   Ht 170.2 cm (67\")   Wt 72.2 kg (159 lb 3.2 oz)   SpO2 98%   BMI 24.93 kg/m²     Physical Exam  Vitals reviewed.   Constitutional:       Appearance: Normal appearance.   Musculoskeletal:      Comments: Normal sternum and xiphoid process   Neurological:      Mental Status: He is alert.   Psychiatric:         Mood and Affect: Mood normal.         Behavior: Behavior normal.         Thought Content: Thought content normal.         Judgment: Judgment normal.        Result Review :                 Assessment and Plan    Diagnoses and all orders for this visit:    1. Lump in chest (Primary)        Follow Up   No follow-ups on file.  Patient was given instructions and counseling regarding his condition or for health maintenance advice. Please see specific information pulled into the AVS if appropriate.       "

## 2021-05-26 ENCOUNTER — TELEPHONE (OUTPATIENT)
Dept: FAMILY MEDICINE CLINIC | Facility: CLINIC | Age: 36
End: 2021-05-26

## 2021-05-26 NOTE — TELEPHONE ENCOUNTER
Caller: Santino Rizzo    Relationship to patient: Self    Best call back number: [phone]    Patient is needing: PATIENT IS CALLING TO STATE HE IS FEELING BETTER AND HE IS WANTING TO KNOW IF HE CAN STOP TAKING THE FOLLOWING MEDICATION.    escitalopram (Lexapro) 10 MG tablet       PLEASE ADVISE.

## 2021-05-26 NOTE — TELEPHONE ENCOUNTER
PER DR. GONZALEZ, CUT PILLS IN HALF AND TAKE 5MG FOR A WEEK AND THEN CAN STOP MEDICATION. I CALLED AND S/W PT AND ADVISED HIM OF THIS. HE VOICED UNDERSTANDING.

## 2021-07-29 NOTE — PATIENT INSTRUCTIONS
Symptoms and examination are consistent with trapezius or paraspinal muscle strain.  Suggest rest and moist heat.  Do neck range of motion exercises as well as some trapezius exercises.  Try Skelaxin 800 mg 3 times a day for a few days.  If no better we will refer to physical therapy.  
Laparoscopic Cholecystectomy

## 2021-08-10 DIAGNOSIS — E78.5 HYPERLIPIDEMIA, UNSPECIFIED HYPERLIPIDEMIA TYPE: Primary | ICD-10-CM

## 2021-11-30 ENCOUNTER — IMMUNIZATION (OUTPATIENT)
Dept: VACCINE CLINIC | Facility: HOSPITAL | Age: 36
End: 2021-11-30

## 2021-11-30 PROCEDURE — 91300 HC SARSCOV02 VAC 30MCG/0.3ML IM: CPT | Performed by: INTERNAL MEDICINE

## 2021-11-30 PROCEDURE — 0004A HC ADM SARSCOV2 30MCG/0.3ML BOOSTER: CPT | Performed by: INTERNAL MEDICINE

## 2022-03-16 RX ORDER — LISINOPRIL 10 MG/1
TABLET ORAL
Qty: 30 TABLET | OUTPATIENT
Start: 2022-03-16

## 2022-03-17 ENCOUNTER — OFFICE VISIT (OUTPATIENT)
Dept: FAMILY MEDICINE CLINIC | Facility: CLINIC | Age: 37
End: 2022-03-17

## 2022-03-17 VITALS
DIASTOLIC BLOOD PRESSURE: 72 MMHG | HEIGHT: 67 IN | SYSTOLIC BLOOD PRESSURE: 120 MMHG | WEIGHT: 158 LBS | BODY MASS INDEX: 24.8 KG/M2 | TEMPERATURE: 97.8 F | HEART RATE: 58 BPM | OXYGEN SATURATION: 99 % | RESPIRATION RATE: 18 BRPM

## 2022-03-17 DIAGNOSIS — E78.5 HYPERLIPIDEMIA, UNSPECIFIED HYPERLIPIDEMIA TYPE: ICD-10-CM

## 2022-03-17 DIAGNOSIS — I10 ESSENTIAL HYPERTENSION: Primary | ICD-10-CM

## 2022-03-17 DIAGNOSIS — E78.6 LOW HDL (UNDER 40): ICD-10-CM

## 2022-03-17 PROCEDURE — 99213 OFFICE O/P EST LOW 20 MIN: CPT | Performed by: INTERNAL MEDICINE

## 2022-03-17 RX ORDER — LISINOPRIL 10 MG/1
10 TABLET ORAL DAILY
Qty: 90 TABLET | Refills: 3 | Status: SHIPPED | OUTPATIENT
Start: 2022-03-17 | End: 2022-05-23 | Stop reason: SDUPTHER

## 2022-03-17 NOTE — PROGRESS NOTES
Subjective   Santino Rizzo is a 36 y.o. male. Patient is here today for   Chief Complaint   Patient presents with   • Med Refill     MEDICATION REVIEW          Vitals:    03/17/22 1529   BP: 120/72   Pulse: 58   Resp: 18   Temp: 97.8 °F (36.6 °C)   SpO2: 99%     Body mass index is 24.74 kg/m².    The following portions of the patient's history were reviewed and updated as appropriate: allergies, current medications, past family history, past medical history, past social history, past surgical history and problem list.    Past Medical History:   Diagnosis Date   • Environmental allergies    • Pilonidal cyst    • Spinal headache     NA      No Known Allergies   Social History     Socioeconomic History   • Marital status: Single   Tobacco Use   • Smoking status: Never Smoker   • Smokeless tobacco: Never Used   Vaping Use   • Vaping Use: Never used   Substance and Sexual Activity   • Alcohol use: Yes     Alcohol/week: 2.0 standard drinks     Types: 2 Shots of liquor per week     Comment: moderate   • Drug use: No   • Sexual activity: Defer        Current Outpatient Medications:   •  lisinopril (PRINIVIL,ZESTRIL) 10 MG tablet, Take 1 tablet by mouth Daily., Disp: 90 tablet, Rfl: 3     Objective     History of Present Illness Santino is here for blood pressure check and to get a refill on his blood pressure medicine.  He has hypertension and hyperlipidemia.  He was on S-Citalopram for anxiety but stopped it and has done well.  He eats healthy and plays competitive volleyball 5 days a week.  He monitors his blood pressure and reports normal readings.  His weight is unchanged in last 6 months.  He did not have any fasting lab work prior to this appointment.  Labs were last checked in January 2021 and LDL cholesterol was high and HDL cholesterol was low.    Review of Systems   Constitutional: Negative for activity change and unexpected weight change.   Respiratory: Negative.    Cardiovascular: Negative.    Gastrointestinal:  Negative.    Neurological: Negative.    Psychiatric/Behavioral: Negative.        Physical Exam  Vitals reviewed.   Constitutional:       Appearance: Normal appearance.   Cardiovascular:      Rate and Rhythm: Normal rate and regular rhythm.      Heart sounds: Normal heart sounds.      Comments: 122/70  Musculoskeletal:      Right lower leg: No edema.      Left lower leg: No edema.   Neurological:      Mental Status: He is alert.   Psychiatric:         Mood and Affect: Mood normal.         Behavior: Behavior normal.         Thought Content: Thought content normal.         Judgment: Judgment normal.         ASSESSMENT blood pressure is well controlled.  Reviewed previous labs and discussed exercise per AHA guidelines.  We will set up some fasting lab work.  We will continue lisinopril 10 mg daily.     Problems Addressed this Visit        Cardiac and Vasculature    Hyperlipidemia    Relevant Orders    Lipid Panel With LDL / HDL Ratio    Essential hypertension - Primary    Relevant Medications    lisinopril (PRINIVIL,ZESTRIL) 10 MG tablet    Other Relevant Orders    Comprehensive Metabolic Panel    Low HDL (under 40)    Relevant Orders    Lipid Panel With LDL / HDL Ratio      Diagnoses       Codes Comments    Essential hypertension    -  Primary ICD-10-CM: I10  ICD-9-CM: 401.9     Hyperlipidemia, unspecified hyperlipidemia type     ICD-10-CM: E78.5  ICD-9-CM: 272.4     Low HDL (under 40)     ICD-10-CM: E78.6  ICD-9-CM: 272.5           PLAN  There are no Patient Instructions on file for this visit.  Return in about 1 year (around 3/17/2023) for labs CMP, lipid.

## 2022-03-25 LAB
ALBUMIN SERPL-MCNC: 4.5 G/DL (ref 4–5)
ALBUMIN/GLOB SERPL: 2.3 {RATIO} (ref 1.2–2.2)
ALP SERPL-CCNC: 49 IU/L (ref 44–121)
ALT SERPL-CCNC: 22 IU/L (ref 0–44)
AST SERPL-CCNC: 21 IU/L (ref 0–40)
BILIRUB SERPL-MCNC: 0.5 MG/DL (ref 0–1.2)
BUN SERPL-MCNC: 12 MG/DL (ref 6–20)
BUN/CREAT SERPL: 11 (ref 9–20)
CALCIUM SERPL-MCNC: 9.3 MG/DL (ref 8.7–10.2)
CHLORIDE SERPL-SCNC: 101 MMOL/L (ref 96–106)
CHOLEST SERPL-MCNC: 204 MG/DL (ref 100–199)
CO2 SERPL-SCNC: 25 MMOL/L (ref 20–29)
CREAT SERPL-MCNC: 1.12 MG/DL (ref 0.76–1.27)
EGFRCR SERPLBLD CKD-EPI 2021: 88 ML/MIN/1.73
GLOBULIN SER CALC-MCNC: 2 G/DL (ref 1.5–4.5)
GLUCOSE SERPL-MCNC: 98 MG/DL (ref 65–99)
HDLC SERPL-MCNC: 39 MG/DL
LDLC SERPL CALC-MCNC: 139 MG/DL (ref 0–99)
LDLC/HDLC SERPL: 3.6 RATIO (ref 0–3.6)
POTASSIUM SERPL-SCNC: 4.5 MMOL/L (ref 3.5–5.2)
PROT SERPL-MCNC: 6.5 G/DL (ref 6–8.5)
SODIUM SERPL-SCNC: 140 MMOL/L (ref 134–144)
TRIGL SERPL-MCNC: 146 MG/DL (ref 0–149)
VLDLC SERPL CALC-MCNC: 26 MG/DL (ref 5–40)

## 2022-05-23 ENCOUNTER — TELEPHONE (OUTPATIENT)
Dept: FAMILY MEDICINE CLINIC | Facility: CLINIC | Age: 37
End: 2022-05-23

## 2022-05-23 RX ORDER — LISINOPRIL 10 MG/1
10 TABLET ORAL DAILY
Qty: 90 TABLET | Refills: 3 | Status: SHIPPED | OUTPATIENT
Start: 2022-05-23

## 2022-05-23 NOTE — TELEPHONE ENCOUNTER
Caller: Santino Rizzo    Relationship: Self    Best call back number:  [phone] (H)    PATIENT IS WANTING TO GET HIS LISINOPRIL PRESCRIPTION SET UP FOR MAIL DELIVERY THROUGH [facility]    HE IS WANTING TO GET THIS IN A 90 DAY SUPPLY AND THE [facility] ON [address] HE GOES THROUGH CAN'T SUPPLY 3 MONTHS AT A TIME    PATIENT IS SEEING IF DR GONZALEZ WILL SEND IN A NEW SCRIPT FOR HIS LISINOPRIL THROUGH [facility]  HE CURRENTLY HAS 15-20 DAYS LEFT ON THE MEDICATION AND DOESN'T NEED A REFILL RIGHT AWAY BUT IS TRYING TO GET THIS MEDICATION SET UP ON AUTOMATIC REFILLS.      PLEASE ADVISE

## 2022-09-23 ENCOUNTER — OFFICE VISIT (OUTPATIENT)
Dept: FAMILY MEDICINE CLINIC | Facility: CLINIC | Age: 37
End: 2022-09-23

## 2022-09-23 VITALS
RESPIRATION RATE: 18 BRPM | DIASTOLIC BLOOD PRESSURE: 72 MMHG | HEIGHT: 67 IN | BODY MASS INDEX: 24.64 KG/M2 | WEIGHT: 157 LBS | OXYGEN SATURATION: 98 % | TEMPERATURE: 97.6 F | HEART RATE: 63 BPM | SYSTOLIC BLOOD PRESSURE: 121 MMHG

## 2022-09-23 DIAGNOSIS — S90.112A CONTUSION OF LEFT GREAT TOE WITHOUT DAMAGE TO NAIL, INITIAL ENCOUNTER: Primary | ICD-10-CM

## 2022-09-23 PROCEDURE — 99212 OFFICE O/P EST SF 10 MIN: CPT | Performed by: NURSE PRACTITIONER

## 2022-09-23 NOTE — PROGRESS NOTES
"Subjective     Santino Rizzo is a 37 y.o.. male.     Pt here today with c/o left great toe dark spot that he noticed on Wednesday night. Pt denies any injuries, trauma, accident. Pt denies any pain. Pt admits to golfing and playing volleyball. 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      The following portions of the patient's history were reviewed and updated as appropriate: allergies, current medications, past family history, past medical history, past social history, past surgical history and problem list.    Past Medical History:   Diagnosis Date   • Environmental allergies    • Pilonidal cyst    • Spinal headache     NA       Past Surgical History:   Procedure Laterality Date   • PILONIDAL CYSTECTOMY N/A 3/30/2016    Procedure: PILONIDAL CYSTECTOMY;  Surgeon: Ra Zeng MD;  [facility]: [facility] [place];  Service:    • WISDOM TOOTH EXTRACTION         Review of Systems   Constitutional: Negative.  Negative for fever.   Respiratory: Negative.    Cardiovascular: Negative.    Skin: Positive for color change (left great toe to top of toe and nail bed).       No Known Allergies    Objective     Vitals:    09/23/22 0805   BP: 121/72   BP Location: Left arm   Patient Position: Sitting   Pulse: 63   Resp: 18   Temp: 97.6 °F (36.4 °C)   TempSrc: Oral   SpO2: 98%   Weight: 71.2 kg (157 lb)   Height: 170.2 cm (67.01\")     Body mass index is 24.58 kg/m².    Physical Exam  Vitals reviewed.   HENT:      Head: Normocephalic.   Eyes:      Pupils: Pupils are equal, round, and reactive to light.   Cardiovascular:      Rate and Rhythm: Normal rate and regular rhythm.   Pulmonary:      Effort: Pulmonary effort is normal.      Breath sounds: Normal breath sounds.   Musculoskeletal:         General: Normal range of motion.   Skin:     Comments: Left great toe: no redness noted, no swelling noted, no open skin areas noted, no discharge noted, no warmth noted, no tenderness noted to palpation, " bruising to medial tip of toe and edge of nail noted   Neurological:      Mental Status: He is alert and oriented to person, place, and time.   Psychiatric:         Behavior: Behavior normal.           Current Outpatient Medications:   •  lisinopril (PRINIVIL,ZESTRIL) 10 MG tablet, Take 1 tablet by mouth Daily., Disp: 90 tablet, Rfl: 3        Diagnoses and all orders for this visit:    1. Contusion of left great toe without damage to nail, initial encounter (Primary)        Patient Instructions   Keep area clean and dry, wash with soap and water daily  Keep area cushioned and protected  Monitor for s/s of infection and if having any call/rto.        Return if symptoms worsen or fail to improve, for Dr. Perales as needed/as recommended.

## 2023-05-12 RX ORDER — LISINOPRIL 10 MG/1
TABLET ORAL
Qty: 90 TABLET | Refills: 3 | Status: SHIPPED | OUTPATIENT
Start: 2023-05-12

## 2024-05-07 RX ORDER — LISINOPRIL 10 MG/1
10 TABLET ORAL DAILY
Qty: 90 TABLET | Refills: 0 | Status: SHIPPED | OUTPATIENT
Start: 2024-05-07

## 2024-08-05 RX ORDER — LISINOPRIL 10 MG/1
TABLET ORAL
Qty: 90 TABLET | Refills: 3 | OUTPATIENT
Start: 2024-08-05

## 2024-08-27 ENCOUNTER — APPOINTMENT (OUTPATIENT)
Dept: CARDIOLOGY | Facility: HOSPITAL | Age: 39
End: 2024-08-27
Payer: COMMERCIAL

## 2024-08-27 ENCOUNTER — HOSPITAL ENCOUNTER (EMERGENCY)
Facility: HOSPITAL | Age: 39
Discharge: HOME | End: 2024-08-27
Attending: EMERGENCY MEDICINE
Payer: COMMERCIAL

## 2024-08-27 VITALS
TEMPERATURE: 98.1 F | OXYGEN SATURATION: 98 % | SYSTOLIC BLOOD PRESSURE: 137 MMHG | DIASTOLIC BLOOD PRESSURE: 97 MMHG | RESPIRATION RATE: 15 BRPM | HEART RATE: 66 BPM

## 2024-08-27 DIAGNOSIS — R20.2 PARESTHESIA: ICD-10-CM

## 2024-08-27 DIAGNOSIS — S40.022A TRAUMATIC HEMATOMA OF LEFT UPPER ARM, INITIAL ENCOUNTER: Primary | ICD-10-CM

## 2024-08-27 LAB
BH CV UPPER ARTERIAL LEFT 2ND DIGIT SYS MAX: 186
BH CV UPPER ARTERIAL LEFT FBI 2ND DIGIT RATIO: 1.41
BH CV UPPER ARTERIAL LEFT WBI RATIO: 1.18
BH CV UPPER ARTERIAL RIGHT 2ND DIGIT SYS MAX: 174
BH CV UPPER ARTERIAL RIGHT FBI 2ND DIGIT RATIO: 1.32
BH CV UPPER ARTERIAL RIGHT WBI RATIO: 1.36
BH CV UPPER VENOUS LEFT AXILLARY AUGMENT: NORMAL
BH CV UPPER VENOUS LEFT AXILLARY COMPRESS: NORMAL
BH CV UPPER VENOUS LEFT AXILLARY PHASIC: NORMAL
BH CV UPPER VENOUS LEFT AXILLARY SPONT: NORMAL
BH CV UPPER VENOUS LEFT BASILIC FOREARM COMPRESS: NORMAL
BH CV UPPER VENOUS LEFT BASILIC UPPER COMPRESS: NORMAL
BH CV UPPER VENOUS LEFT BRACHIAL COMPRESS: NORMAL
BH CV UPPER VENOUS LEFT CEPHALIC FOREARM COMPRESS: NORMAL
BH CV UPPER VENOUS LEFT CEPHALIC UPPER COMPRESS: NORMAL
BH CV UPPER VENOUS LEFT INTERNAL JUGULAR AUGMENT: NORMAL
BH CV UPPER VENOUS LEFT INTERNAL JUGULAR COMPRESS: NORMAL
BH CV UPPER VENOUS LEFT INTERNAL JUGULAR PHASIC: NORMAL
BH CV UPPER VENOUS LEFT INTERNAL JUGULAR SPONT: NORMAL
BH CV UPPER VENOUS LEFT RADIAL COMPRESS: NORMAL
BH CV UPPER VENOUS LEFT SUBCLAVIAN AUGMENT: NORMAL
BH CV UPPER VENOUS LEFT SUBCLAVIAN COMPRESS: NORMAL
BH CV UPPER VENOUS LEFT SUBCLAVIAN PHASIC: NORMAL
BH CV UPPER VENOUS LEFT SUBCLAVIAN SPONT: NORMAL
BH CV UPPER VENOUS LEFT ULNAR COMPRESS: NORMAL
BH CV UPPER VENOUS RIGHT SUBCLAVIAN AUGMENT: NORMAL
BH CV UPPER VENOUS RIGHT SUBCLAVIAN COMPRESS: NORMAL
BH CV UPPER VENOUS RIGHT SUBCLAVIAN PHASIC: NORMAL
BH CV UPPER VENOUS RIGHT SUBCLAVIAN SPONT: NORMAL
UPPER ARTERIAL LEFT ARM BRACHIAL SYS MAX: 126
UPPER ARTERIAL LEFT ARM RADIAL SYS MAX: 155
UPPER ARTERIAL LEFT ARM ULNAR SYS MAX: 156
UPPER ARTERIAL RIGHT ARM BRACHIAL SYS MAX: 132
UPPER ARTERIAL RIGHT ARM RADIAL SYS MAX: 180
UPPER ARTERIAL RIGHT ARM ULNAR SYS MAX: 189

## 2024-08-27 PROCEDURE — 93922 UPR/L XTREMITY ART 2 LEVELS: CPT | Performed by: SURGERY

## 2024-08-27 PROCEDURE — 99284 EMERGENCY DEPT VISIT MOD MDM: CPT

## 2024-08-27 PROCEDURE — 93971 EXTREMITY STUDY: CPT

## 2024-08-27 PROCEDURE — 93971 EXTREMITY STUDY: CPT | Performed by: SURGERY

## 2024-08-27 PROCEDURE — 93922 UPR/L XTREMITY ART 2 LEVELS: CPT

## 2024-08-27 NOTE — ED PROVIDER NOTES
MD ATTESTATION NOTE    The GAMAL and I have discussed this patient's history, physical exam, and treatment plan.  I have reviewed the documentation and personally had a face to face interaction with the patient. I affirm the documentation and agree with the treatment and plan.  The attached note describes my personal findings.        SHARED APC FACE TO FACE: I performed a substantive part of the MDM during the patient's E/M visit. I personally evaluated and examined the patient. I personally made or approved the documented management plan and acknowledge its risk of complications.      Brief HPI: Patient presents for evaluation of left arm injury.  Patient was hit in the left arm by a golf ball.  Patient has large bruise.  States this happened on Saturday.  Patient states this morning he started having tingling in his arm and cold feeling to his hand.  Has no face or leg symptoms    PHYSICAL EXAM  ED Triage Vitals [08/27/24 1133]   Temp Heart Rate Resp BP SpO2   98.1 °F (36.7 °C) 66 16 -- 98 %      Temp src Heart Rate Source Patient Position BP Location FiO2 (%)   Tympanic Monitor -- -- --         GENERAL: no acute distress  HENT: nares patent  EYES: no scleral icterus  CV: regular rhythm, normal rate  RESPIRATORY: normal effort  ABDOMEN: soft  MUSCULOSKELETAL: no deformity.  Large hematoma to the left arm.  Bounding pulses radial artery  NEURO: alert, moves all extremities, follows commands  PSYCH:  calm, cooperative  SKIN: warm, dry    Vital signs and nursing notes reviewed.    ED Course as of 08/27/24 1745 Tue Aug 27, 2024   1639 Discussed pt with Agne, no arterial abnormality. No dvt.  [JS]      ED Course User Index  [JS] Cora Drake APRN         Plan: discharge       David Brito MD  08/27/24 1746

## 2024-08-27 NOTE — ED NOTES
Pt states that he was struck in the bicep by a golf ball on Saturday. Reports pain and bruising still.

## 2024-08-27 NOTE — ED PROVIDER NOTES
EMERGENCY DEPARTMENT ENCOUNTER  Room Number:  S04/04  PCP: Yonatan Perales MD  Independent Historians: Patient      HPI:  Chief Complaint: had concerns including Arm Injury (/).     A complete HPI/ROS/PMH/PSH/SH/FH are unobtainable due to: None    Chronic or social conditions impacting patient care (Social Determinants of Health): None      Context: Santino Rizzo is a 39 y.o. male who presents to the ED c/o acute left arm pain after getting hit with a golf ball 4 days prior. He reports swelling and pain in the area where the golf ball impacted but states that pain is overall improving. This morning he noted some tingling of his fingers and a cold sensation in his left hand. He denies any weakness in the hand.    There are no additional complaints at this time.      PAST MEDICAL HISTORY  Active Ambulatory Problems     Diagnosis Date Noted    Wound of gluteal cleft 07/19/2016    Acute pain of both shoulders 04/17/2018    Strain of left trapezius muscle 08/01/2018    Right ear pain 09/05/2018    Acute otitis media 09/05/2018    Sebaceous cyst of penis 09/28/2018    Acute upper respiratory infection 02/13/2019    Dysfunction of eustachian tube 02/13/2019    Ear popping 02/13/2019    Hyperlipidemia 02/13/2019    Seasonal allergic rhinitis 02/13/2019    Sebaceous cyst 02/13/2019    Sensation of plugged ear 02/13/2019    Sneezing 02/13/2019    Somnolence 02/13/2019    Sore throat 02/13/2019    Strain of wrist 02/13/2019    Pain in wrist 02/13/2019    Headache 02/13/2019    Viral upper respiratory tract infection 12/06/2019    Essential hypertension 01/16/2020    Low HDL (under 40) 02/05/2021    Anxiety 04/27/2021    Lump in chest 05/20/2021     Resolved Ambulatory Problems     Diagnosis Date Noted    Neck pain on left side 08/01/2018     Past Medical History:   Diagnosis Date    Environmental allergies     Pilonidal cyst     Spinal headache          PAST SURGICAL HISTORY  Past Surgical History:   Procedure Laterality  Date    PILONIDAL CYSTECTOMY N/A 3/30/2016    Procedure: PILONIDAL CYSTECTOMY;  Surgeon: Ra Zeng MD;  [place] [facility] [place] [facility] [place]  Service:     WISDOM TOOTH EXTRACTION           FAMILY HISTORY  Family History   Problem Relation Age of Onset    Hypertension Mother     Hypertension Father          SOCIAL HISTORY  Social History     Socioeconomic History    Marital status: Single   Tobacco Use    Smoking status: Never    Smokeless tobacco: Never   Vaping Use    Vaping status: Never Used   Substance and Sexual Activity    Alcohol use: Yes     Alcohol/week: 2.0 standard drinks of alcohol     Types: 2 Shots of liquor per week     Comment: moderate    Drug use: No    Sexual activity: Defer         ALLERGIES  Patient has no known allergies.      REVIEW OF SYSTEMS  Review of Systems  Included in HPI  All systems reviewed and negative except for those discussed in HPI.      PHYSICAL EXAM    I have reviewed the triage vital signs and nursing notes.    ED Triage Vitals [08/27/24 1133]   Temp Heart Rate Resp BP SpO2   98.1 °F (36.7 °C) 66 16 -- 98 %      Temp src Heart Rate Source Patient Position BP Location FiO2 (%)   Tympanic Monitor -- -- --       Physical Exam  GENERAL: awake, alert, no acute distress  SKIN: Warm, dry  HENT: Normocephalic, atraumatic  EYES: no scleral icterus  CV: regular rhythm, regular rate  RESPIRATORY: normal effort, lungs clear. Yellow bruise of left chest wall, non tender to palpation. No crepitus.   ABDOMEN: soft, nontender, nondistended  MUSCULOSKELETAL: no deformity, specifically no jon deformity of the LUE. There is swelling and hematoma formation of the left biceps with purple to yellow discoloration noted. The  is 5/5. BUE strength intact. Brachial and radial pulse intact. Sensation intact.   NEURO: alert, moves all extremities, follows commands        LAB RESULTS  Recent Results (from the past 24 hour(s))   Doppler Arterial Single Level Upper Extremity - Bilateral CAR    Collection  Time: 08/27/24  4:27 PM   Result Value Ref Range    Upper arterial right arm brachial sys max 132     Upper arterial left arm brachial sys max 126     Upper arterial right arm radial sys max 180     Upper arterial left arm radial sys max 155     Upper arterial right arm ulnar sys max 189     Upper arterial left arm ulnar sys max 156     RIGHT 2ND DIGIT SYS      LEFT 2ND DIGIT SYS      RIGHT WBI RATIO 1.36     LEFT WBI RATIO 1.18     RIGHT FBI 2ND DIGIT RATIO 1.32     LEFT FBI 2ND DIGIT RATIO 1.41    Duplex Venous Upper Extremity - Left CAR    Collection Time: 08/27/24  4:27 PM   Result Value Ref Range    Right Subclavian Spont Y     Right Subclavian Phasic Y     Right Subclavian Compress C     Right Subclavian Augment Y     Left Internal Jugular Spont Y     Left Internal Jugular Phasic Y     Left Internal Jugular Compress C     Left Internal Jugular Augment Y     Left Subclavian Spont Y     Left Subclavian Phasic Y     Left Subclavian Compress C     Left Subclavian Augment Y     Left Axillary Spont Y     Left Axillary Phasic Y     Left Axillary Compress C     Left Axillary Augment Y     Left Brachial Compress C     Left Radial Compress C     Left Ulnar Compress C     Left Basilic Upper Compress C     Left Basilic Forearm Compress C     Left Cephalic Upper Compress C     Left Cephalic Forearm Compress C          RADIOLOGY  Doppler Arterial Single Level Upper Extremity - Bilateral CAR    Result Date: 8/27/2024    Normal arterial pressures on the right. Normal digital pressures noted on the right.   Normal arterial pressures on the left. Normal digital pressures noted on the left.     Duplex Venous Upper Extremity - Left CAR    Result Date: 8/27/2024    Normal left upper extremity venous duplex scan.        MEDICATIONS GIVEN IN ER  Medications - No data to display      ORDERS PLACED DURING THIS VISIT:  No orders of the defined types were placed in this encounter.        OUTPATIENT MEDICATION  MANAGEMENT:  No current Epic-ordered facility-administered medications on file.     Current Outpatient Medications Ordered in Epic   Medication Sig Dispense Refill    lisinopril (PRINIVIL,ZESTRIL) 10 MG tablet Take 1 tablet by mouth Daily. NEED APPOINTMENT WITH PCP 90 tablet 0         PROCEDURES  Procedures            PROGRESS, DATA ANALYSIS, CONSULTS, AND MEDICAL DECISION MAKING  All labs have been independently interpreted by me.  All radiology studies have been reviewed by me. All EKG's have been independently viewed and interpreted by me.  Discussion below represents my analysis of pertinent findings related to patient's condition, differential diagnosis, treatment plan and final disposition.    Differential diagnosis includes but is not limited to arterial injury, hematoma, compartment syndrome, DVT, superficial venous thrombophlebitis .    Patient presents with chief complaint of left upper extremity pain and bruising onset after impact with a golf ball several days prior.  Physical exam is unremarkable.  Arterial and venous study are negative.  We discussed warm compresses rest and elevation of the extremity.  He asked specifically about playing volleyball and I would recommend against any contact sports until symptoms are resolved.  He has been given strict return precautions including worsening or new symptoms or weakness of the upper extremity should be evaluated immediately.  If symptoms do not seem to be resolving with decrease in swelling, he should follow-up with his family doctor or return to the ED.  He states understanding of the plan.  Stable at time of discharge.    ED Course as of 08/27/24 1739   Tue Aug 27, 2024   1639 Discussed pt with Ange, no arterial abnormality. No dvt.  [JS]      ED Course User Index  [JS] Cora Drake APRN             AS OF 17:39 EDT VITALS:    BP - 137/97  HR - 66  TEMP - 98.1 °F (36.7 °C) (Tympanic)  O2 SATS - 98%    COMPLEXITY OF CARE  Admission was  considered but after careful review of the patient's presentation, physical examination, diagnostic results, and response to treatment the patient may be safely discharged with outpatient follow-up.      DIAGNOSIS  Final diagnoses:   Traumatic hematoma of left upper arm, initial encounter   Paresthesia         DISPOSITION  ED Disposition       ED Disposition   Discharge    Condition   Stable    Comment   --                Please note that portions of this document were completed with a voice recognition program.    Note Disclaimer: At [facility], we believe that sharing information builds trust and better relationships. You are receiving this note because you recently visited [facility]. It is possible you will see health information before a provider has talked with you about it. This kind of information can be easy to misunderstand. To help you fully understand what it means for your health, we urge you to discuss this note with your provider.         Cora Drake, VITALIY  08/27/24 1738

## 2024-09-30 RX ORDER — LISINOPRIL 10 MG/1
10 TABLET ORAL DAILY
Qty: 30 TABLET | Refills: 0 | Status: SHIPPED | OUTPATIENT
Start: 2024-09-30 | End: 2024-10-07 | Stop reason: SDUPTHER

## 2024-09-30 NOTE — TELEPHONE ENCOUNTER
Caller: Santino Rizzo    Relationship: Self    Best call back number: [phone]       What was the call regarding: PATIENT IS NEEDING A 7 DAY SUPPLY SENT TO [facility] [id number] - [place], [place] - [address] AT [address] - [phone]  - [phone] FX TO LAST UNTIL HE RECEIVED THIS REFILL THROUGH MAIL ORDER    PLEASE ADVISE

## 2024-09-30 NOTE — TELEPHONE ENCOUNTER
Caller: Santino Rizzo    Relationship: Self    Best call back number: [phone]     Requested Prescriptions:   Requested Prescriptions     Pending Prescriptions Disp Refills    lisinopril (PRINIVIL,ZESTRIL) 10 MG tablet 90 tablet 0     Sig: Take 1 tablet by mouth Daily. NEED APPOINTMENT WITH PCP        Pharmacy where request should be sent: [facility] [address] [phone] [facility] [phone]      Last office visit with prescribing clinician: Visit date not found   Last telemedicine visit with prescribing clinician: Visit date not found   Next office visit with prescribing clinician: Visit date not found       Branden Taylor Rep   09/30/24 09:30 EDT

## 2024-10-07 ENCOUNTER — OFFICE VISIT (OUTPATIENT)
Dept: FAMILY MEDICINE CLINIC | Facility: CLINIC | Age: 39
End: 2024-10-07
Payer: COMMERCIAL

## 2024-10-07 VITALS
OXYGEN SATURATION: 98 % | SYSTOLIC BLOOD PRESSURE: 122 MMHG | DIASTOLIC BLOOD PRESSURE: 68 MMHG | HEART RATE: 68 BPM | WEIGHT: 176 LBS | HEIGHT: 67 IN | BODY MASS INDEX: 27.62 KG/M2 | RESPIRATION RATE: 16 BRPM

## 2024-10-07 DIAGNOSIS — I10 ESSENTIAL HYPERTENSION: ICD-10-CM

## 2024-10-07 DIAGNOSIS — E78.6 LOW HDL (UNDER 40): ICD-10-CM

## 2024-10-07 DIAGNOSIS — Z11.59 ENCOUNTER FOR HEPATITIS C SCREENING TEST FOR LOW RISK PATIENT: ICD-10-CM

## 2024-10-07 DIAGNOSIS — Z23 NEED FOR IMMUNIZATION AGAINST INFLUENZA: ICD-10-CM

## 2024-10-07 DIAGNOSIS — Z00.00 ANNUAL PHYSICAL EXAM: Primary | ICD-10-CM

## 2024-10-07 DIAGNOSIS — E78.5 HYPERLIPIDEMIA, UNSPECIFIED HYPERLIPIDEMIA TYPE: ICD-10-CM

## 2024-10-07 PROCEDURE — 99395 PREV VISIT EST AGE 18-39: CPT | Performed by: INTERNAL MEDICINE

## 2024-10-07 PROCEDURE — 90656 IIV3 VACC NO PRSV 0.5 ML IM: CPT | Performed by: INTERNAL MEDICINE

## 2024-10-07 PROCEDURE — 90471 IMMUNIZATION ADMIN: CPT | Performed by: INTERNAL MEDICINE

## 2024-10-07 RX ORDER — LISINOPRIL 10 MG/1
10 TABLET ORAL DAILY
Qty: 90 TABLET | Refills: 3 | Status: SHIPPED | OUTPATIENT
Start: 2024-10-07

## 2024-10-07 NOTE — PROGRESS NOTES
Subjective   Santino Rizzo is a 39 y.o. male. Patient is here today for   Chief Complaint   Patient presents with    Med Refill    Annual Exam          Vitals:    10/07/24 0813   BP: 122/68   Pulse: 68   Resp: 16   SpO2: 98%     Body mass index is 27.56 kg/m².    The following portions of the patient's history were reviewed and updated as appropriate: allergies, current medications, past family history, past medical history, past social history, past surgical history and problem list.    Past Medical History:   Diagnosis Date    Environmental allergies     Pilonidal cyst     Spinal headache     NA      No Known Allergies   Social History     Socioeconomic History    Marital status: Single   Tobacco Use    Smoking status: Never    Smokeless tobacco: Never   Vaping Use    Vaping status: Never Used   Substance and Sexual Activity    Alcohol use: Yes     Alcohol/week: 2.0 standard drinks of alcohol     Types: 2 Shots of liquor per week     Comment: moderate    Drug use: No    Sexual activity: Defer        Current Outpatient Medications:     lisinopril (PRINIVIL,ZESTRIL) 10 MG tablet, Take 1 tablet by mouth Daily. NEED APPOINTMENT WITH PCP, Disp: 90 tablet, Rfl: 3     EKG not done    Objective   History of Present Illness Santino is here for an annual physical examination.  He has well-controlled hypertension.  He feels well.  He eats healthy.  He plays golf 4 days a week and walks and also plays volleyball 4 days a week.  He monitors his blood pressure occasionally and reports normal readings.  He also has elevated LDL cholesterol and was last checked 2 years ago.    Review of Systems   Constitutional:  Negative for activity change and unexpected weight change.   Respiratory: Negative.     Cardiovascular: Negative.    Gastrointestinal: Negative.    Genitourinary: Negative.    Musculoskeletal: Negative.    Neurological: Negative.    Psychiatric/Behavioral: Negative.         Physical Exam  Vitals reviewed.    Constitutional:       Appearance: Normal appearance.   HENT:      Right Ear: Tympanic membrane normal.      Left Ear: Tympanic membrane normal.      Mouth/Throat:      Mouth: Mucous membranes are moist.      Pharynx: Oropharynx is clear.   Eyes:      Extraocular Movements: Extraocular movements intact.      Pupils: Pupils are equal, round, and reactive to light.   Neck:      Vascular: No carotid bruit.   Cardiovascular:      Rate and Rhythm: Normal rate and regular rhythm.      Heart sounds: Normal heart sounds.      Comments: 112/80  Pulmonary:      Effort: Pulmonary effort is normal.      Breath sounds: Normal breath sounds.   Musculoskeletal:      Cervical back: Normal range of motion.      Right lower leg: No edema.      Left lower leg: No edema.   Lymphadenopathy:      Cervical: No cervical adenopathy.   Neurological:      Mental Status: He is alert.   Psychiatric:         Mood and Affect: Mood normal.         Behavior: Behavior normal.         Thought Content: Thought content normal.         Judgment: Judgment normal.         Assessment blood pressure is well-controlled.  Lab work is pending.  Discussed healthy heart diet with emphasis on high-fiber and exercise per HA guidelines.  Will give a flu shot today.  ASSESSMENT    Problems Addressed this Visit          Cardiac and Vasculature    Hyperlipidemia    Essential hypertension    Relevant Medications    lisinopril (PRINIVIL,ZESTRIL) 10 MG tablet    Low HDL (under 40)       Health Encounters    Annual physical exam - Primary    Relevant Orders    Comprehensive Metabolic Panel    Lipid Panel With LDL / HDL Ratio    CBC & Differential     Other Visit Diagnoses       Encounter for hepatitis C screening test for low risk patient        Relevant Orders    Hepatitis C Antibody    Need for immunization against influenza        Relevant Orders    Fluzone >6mos (Completed)          Diagnoses         Codes Comments    Annual physical exam    -  Primary ICD-10-CM:  Z00.00  ICD-9-CM: V70.0     Encounter for hepatitis C screening test for low risk patient     ICD-10-CM: Z11.59  ICD-9-CM: V73.89     Need for immunization against influenza     ICD-10-CM: Z23  ICD-9-CM: V04.81     Essential hypertension     ICD-10-CM: I10  ICD-9-CM: 401.9     Hyperlipidemia, unspecified hyperlipidemia type     ICD-10-CM: E78.5  ICD-9-CM: 272.4     Low HDL (under 40)     ICD-10-CM: E78.6  ICD-9-CM: 272.5             PLAN  There are no Patient Instructions on file for this visit.    No follow-ups on file.

## 2024-10-08 LAB
ALBUMIN SERPL-MCNC: 4.7 G/DL (ref 3.5–5.2)
ALBUMIN/GLOB SERPL: 2.6 G/DL
ALP SERPL-CCNC: 63 U/L (ref 39–117)
ALT SERPL-CCNC: 21 U/L (ref 1–41)
AST SERPL-CCNC: 19 U/L (ref 1–40)
BASOPHILS # BLD AUTO: 0.06 10*3/MM3 (ref 0–0.2)
BASOPHILS NFR BLD AUTO: 1.2 % (ref 0–1.5)
BILIRUB SERPL-MCNC: 0.4 MG/DL (ref 0–1.2)
BUN SERPL-MCNC: 17 MG/DL (ref 6–20)
BUN/CREAT SERPL: 14.7 (ref 7–25)
CALCIUM SERPL-MCNC: 9.2 MG/DL (ref 8.6–10.5)
CHLORIDE SERPL-SCNC: 104 MMOL/L (ref 98–107)
CHOLEST SERPL-MCNC: 245 MG/DL (ref 0–200)
CO2 SERPL-SCNC: 25.2 MMOL/L (ref 22–29)
CREAT SERPL-MCNC: 1.16 MG/DL (ref 0.76–1.27)
EGFRCR SERPLBLD CKD-EPI 2021: 83.2 ML/MIN/1.73
EOSINOPHIL # BLD AUTO: 0.34 10*3/MM3 (ref 0–0.4)
EOSINOPHIL NFR BLD AUTO: 6.8 % (ref 0.3–6.2)
ERYTHROCYTE [DISTWIDTH] IN BLOOD BY AUTOMATED COUNT: 12.3 % (ref 12.3–15.4)
GLOBULIN SER CALC-MCNC: 1.8 GM/DL
GLUCOSE SERPL-MCNC: 97 MG/DL (ref 65–99)
HCT VFR BLD AUTO: 42.1 % (ref 37.5–51)
HCV IGG SERPL QL IA: NON REACTIVE
HDLC SERPL-MCNC: 40 MG/DL (ref 40–60)
HGB BLD-MCNC: 14.2 G/DL (ref 13–17.7)
IMM GRANULOCYTES # BLD AUTO: 0.03 10*3/MM3 (ref 0–0.05)
IMM GRANULOCYTES NFR BLD AUTO: 0.6 % (ref 0–0.5)
LDLC SERPL CALC-MCNC: 178 MG/DL (ref 0–100)
LDLC/HDLC SERPL: 4.38 {RATIO}
LYMPHOCYTES # BLD AUTO: 1.79 10*3/MM3 (ref 0.7–3.1)
LYMPHOCYTES NFR BLD AUTO: 35.7 % (ref 19.6–45.3)
MCH RBC QN AUTO: 30 PG (ref 26.6–33)
MCHC RBC AUTO-ENTMCNC: 33.7 G/DL (ref 31.5–35.7)
MCV RBC AUTO: 88.8 FL (ref 79–97)
MONOCYTES # BLD AUTO: 0.52 10*3/MM3 (ref 0.1–0.9)
MONOCYTES NFR BLD AUTO: 10.4 % (ref 5–12)
NEUTROPHILS # BLD AUTO: 2.28 10*3/MM3 (ref 1.7–7)
NEUTROPHILS NFR BLD AUTO: 45.3 % (ref 42.7–76)
NRBC BLD AUTO-RTO: 0 /100 WBC (ref 0–0.2)
PLATELET # BLD AUTO: 298 10*3/MM3 (ref 140–450)
POTASSIUM SERPL-SCNC: 4.6 MMOL/L (ref 3.5–5.2)
PROT SERPL-MCNC: 6.5 G/DL (ref 6–8.5)
RBC # BLD AUTO: 4.74 10*6/MM3 (ref 4.14–5.8)
SODIUM SERPL-SCNC: 141 MMOL/L (ref 136–145)
TRIGL SERPL-MCNC: 149 MG/DL (ref 0–150)
VLDLC SERPL CALC-MCNC: 27 MG/DL (ref 5–40)
WBC # BLD AUTO: 5.02 10*3/MM3 (ref 3.4–10.8)

## 2025-04-30 NOTE — TELEPHONE ENCOUNTER
Caller: Santino Rizzo    Relationship: Self    Best call back number: [phone]     Requested Prescriptions:   Requested Prescriptions     Pending Prescriptions Disp Refills    lisinopril (PRINIVIL,ZESTRIL) 10 MG tablet 90 tablet 3     Sig: Take 1 tablet by mouth Daily. NEED APPOINTMENT WITH PCP        Pharmacy where request should be sent: [facility] [address] [phone] [facility] [phone]      Last office visit with prescribing clinician: 10/7/2024   Last telemedicine visit with prescribing clinician: Visit date not found   Next office visit with prescribing clinician: 10/13/2025     Does the patient have less than a 3 day supply:  [] Yes  [x] No    Would you like a call back once the refill request has been completed: [] Yes [x] No    If the office needs to give you a call back, can they leave a voicemail: [] Yes [x] No    Branden Marquez Rep   04/30/25 14:51 EDT

## 2025-05-02 RX ORDER — LISINOPRIL 10 MG/1
10 TABLET ORAL DAILY
Qty: 90 TABLET | Refills: 1 | Status: SHIPPED | OUTPATIENT
Start: 2025-05-02

## 2025-07-03 ENCOUNTER — OFFICE VISIT (OUTPATIENT)
Dept: FAMILY MEDICINE CLINIC | Facility: CLINIC | Age: 40
End: 2025-07-03
Payer: COMMERCIAL

## 2025-07-03 VITALS
HEIGHT: 67 IN | OXYGEN SATURATION: 99 % | BODY MASS INDEX: 27.31 KG/M2 | WEIGHT: 174 LBS | DIASTOLIC BLOOD PRESSURE: 64 MMHG | HEART RATE: 75 BPM | SYSTOLIC BLOOD PRESSURE: 110 MMHG | RESPIRATION RATE: 16 BRPM

## 2025-07-03 DIAGNOSIS — M25.511 ACUTE PAIN OF RIGHT SHOULDER: Primary | ICD-10-CM

## 2025-07-03 PROCEDURE — 99213 OFFICE O/P EST LOW 20 MIN: CPT | Performed by: INTERNAL MEDICINE

## 2025-07-03 NOTE — PROGRESS NOTES
Subjective   Santino Rizzo is a 40 y.o. male. Patient is here today for   Chief Complaint   Patient presents with    Shoulder Pain     Right shoulder           Vitals:    07/03/25 1451   BP: 110/64   Pulse: 75   Resp: 16   SpO2: 99%     Body mass index is 27.25 kg/m².    The following portions of the patient's history were reviewed and updated as appropriate: allergies, current medications, past family history, past medical history, past social history, past surgical history and problem list.    Past Medical History:   Diagnosis Date    Environmental allergies     Pilonidal cyst     Spinal headache     NA      No Known Allergies   Social History     Socioeconomic History    Marital status: Single   Tobacco Use    Smoking status: Never    Smokeless tobacco: Never   Vaping Use    Vaping status: Never Used   Substance and Sexual Activity    Alcohol use: Yes     Alcohol/week: 2.0 standard drinks of alcohol     Types: 2 Shots of liquor per week     Comment: moderate    Drug use: No    Sexual activity: Defer        Current Outpatient Medications:     lisinopril (PRINIVIL,ZESTRIL) 10 MG tablet, Take 1 tablet by mouth Daily. NEED APPOINTMENT WITH PCP, Disp: 90 tablet, Rfl: 1     Objective     History of Present Illness   History of Present Illness  The patient is a 40-year-old male who presents with complaints of right shoulder pain.    He began experiencing right shoulder pain approximately a month ago, which he attributes to his active participation in volleyball. The pain is particularly noticeable during certain movements, such as diving for the ball and landing on his shoulder. On a few occasions, the pain has been so intense that it felt as though his arm was immobilized. The pain, described as shooting and sharp, would subside after a few minutes. Over the past couple of days, he has noticed a decrease in the pain, even during certain movements, suggesting an improvement. He has not taken any medication or engaged  in strength training exercises.    He reports a recent increase in slouching, particularly when sitting at work. He does not engage in weightlifting but has access to a gym at his workplace. He continues to play volleyball three times a week and golf on other days. He plans to take a break from volleyball for a few weeks to allow his shoulder to heal.    Review of Systems   Constitutional:  Negative for activity change.   Musculoskeletal:  Negative for neck pain.       Physical Exam  Vitals reviewed.   Constitutional:       Appearance: Normal appearance.   Musculoskeletal:      Comments: Right shoulder is slightly rolled forward.  Range of motion is normal.  There are mildly positive supraspinatus signs.   Neurological:      Mental Status: He is alert.       Physical Exam  Musculoskeletal: Right shoulder exhibits pain with specific movements including abduction, forward flexion, and external rotation. Pain localized to the supraspinatus and infraspinatus muscles and tendons.  No significant deformity or swelling noted. Normal range of motion with pain on certain movements.    Results         Assessment    ASSESSMENT    Problems Addressed this Visit          Musculoskeletal and Injuries    Acute pain of right shoulder - Primary     Diagnoses         Codes Comments      Acute pain of right shoulder    -  Primary ICD-10-CM: M25.511  ICD-9-CM: 719.41           Assessment & Plan  1. Right shoulder pain.  - Symptoms suggest tendinitis, with no evidence of a rotator cuff tear. Pain exacerbated by specific movements, particularly when the shoulder is rolled forward.  - Physical exam shows pain with certain shoulder movements, indicating involvement of the supraspinatus and infraspinatus tendons.  - Discussed the importance of resting the shoulder, performing specific exercises to strengthen the posterior deltoid muscle, and avoiding activities that exacerbate the pain.      Follow-up  - Scheduled for annual physical in  10/2025.    PLAN  There are no Patient Instructions on file for this visit.  No follow-ups on file.    Patient or patient representative verbalized consent for the use of Ambient Listening during the visit with  Yonatan Perales MD for chart documentation. 7/3/2025  15:24 EDT

## 2025-07-22 ENCOUNTER — OFFICE VISIT (OUTPATIENT)
Dept: FAMILY MEDICINE CLINIC | Facility: CLINIC | Age: 40
End: 2025-07-22
Payer: COMMERCIAL

## 2025-07-22 VITALS
SYSTOLIC BLOOD PRESSURE: 112 MMHG | BODY MASS INDEX: 27.62 KG/M2 | DIASTOLIC BLOOD PRESSURE: 70 MMHG | HEIGHT: 67 IN | OXYGEN SATURATION: 98 % | WEIGHT: 176 LBS | RESPIRATION RATE: 16 BRPM | HEART RATE: 76 BPM

## 2025-07-22 DIAGNOSIS — R29.898 POPPING OF LEFT KNEE JOINT: Primary | ICD-10-CM

## 2025-07-22 PROCEDURE — 99213 OFFICE O/P EST LOW 20 MIN: CPT | Performed by: INTERNAL MEDICINE

## 2025-07-22 NOTE — PROGRESS NOTES
Subjective   Santino Rizzo is a 40 y.o. male. Patient is here today for   Chief Complaint   Patient presents with    Knee Pain     Left knee Popping sound           Vitals:    07/22/25 1507   BP: 112/70   Pulse: 76   Resp: 16   SpO2: 98%     Body mass index is 27.56 kg/m².    The following portions of the patient's history were reviewed and updated as appropriate: allergies, current medications, past family history, past medical history, past social history, past surgical history and problem list.    Past Medical History:   Diagnosis Date    Environmental allergies     Pilonidal cyst     Spinal headache     NA      No Known Allergies   Social History     Socioeconomic History    Marital status: Single   Tobacco Use    Smoking status: Never    Smokeless tobacco: Never   Vaping Use    Vaping status: Never Used   Substance and Sexual Activity    Alcohol use: Yes     Alcohol/week: 2.0 standard drinks of alcohol     Types: 2 Shots of liquor per week     Comment: moderate    Drug use: No    Sexual activity: Defer        Current Outpatient Medications:     lisinopril (PRINIVIL,ZESTRIL) 10 MG tablet, Take 1 tablet by mouth Daily. NEED APPOINTMENT WITH PCP, Disp: 90 tablet, Rfl: 1     Objective     History of Present Illness   History of Present Illness  The patient is a 40-year-old male who presents with complaints of knee pain.    He experienced knee pain approximately 2 weeks ago, which subsided after a day or two. However, he has been experiencing a consistent clicking or popping sound in his knee for the past 2 weeks. The pain is not severe, but he occasionally feels his knee giving way. He does not recall any specific incident that could have caused the injury. The pain was initially localized to one area but has since spread slightly. He experiences the popping sensation when he puts weight on his knee, particularly when playing golf, where he tends to lean on his left side. The popping is also noticeable when he  straightens his knee. He can fully bend his knee, but it causes some discomfort.     He first noticed the pain after playing volleyball, but it subsided after a day or two, leaving only the popping sensation. He has not played volleyball since then and has not been able to climb the usual 7 flights of stairs at work. He has been trying to alleviate the discomfort by flaring his foot out. He sometimes experiences cramping in his toe during sleep, which he believes might be due to stretching. He maintains good hydration, drinking several cups of water daily at work and home.    Hobbies: Golf, volleyball  Coffee/Tea/Caffeine-containing Drinks: Consumes some cokes  Sleep: Experiences cramping in his toe during sleep    Review of Systems   Constitutional:  Positive for activity change.   Respiratory: Negative.     Cardiovascular: Negative.        Physical Exam  Vitals reviewed.   Constitutional:       Appearance: Normal appearance.   Musculoskeletal:      Comments: Left knee has normal range of motion.  There is no effusion.  Ligament and meniscus exams are normal.  With weightbearing and flexing the knee there is popping behind the kneecap without pain.   Neurological:      Mental Status: He is alert.   Psychiatric:         Mood and Affect: Mood normal.         Behavior: Behavior normal.         Thought Content: Thought content normal.         Judgment: Judgment normal.       Physical Exam  Musculoskeletal: Normal collateral and cruciate ligaments. No reproducible meniscus pain. Mild pain at the back of the knee when fully flexed. No significant popping or grinding felt. Quadriceps well-developed and strong.    Results         Assessment    ASSESSMENT    Problems Addressed this Visit          Other    Popping of left knee joint - Primary     Diagnoses         Codes Comments      Popping of left knee joint    -  Primary ICD-10-CM: R29.898  ICD-9-CM: 719.66           Assessment & Plan  1. Popping of the left knee  joint.  - Symptoms include a clicking or popping sound in the knee for the past 2 weeks, with occasional giving way but no significant pain.  - Physical examination reveals intact collateral and cruciate ligaments, no reproducible meniscus injury, and no evidence of osteoarthritis. The quadriceps are well-developed and strong.  - Discussed the possibility of chondromalacia patella and the role of synovial fluid in joint popping. Advised that the popping sound is likely from the back of the kneecap and not indicative of a serious condition.  - Recommended avoiding strenuous activities such as walking up and down stairs, running, or jumping for the next week. Advised to stay hydrated to prevent muscle cramps. If symptoms persist, an MRI of the knee or referral to an orthopedic surgeon will be considered.    PLAN  There are no Patient Instructions on file for this visit.  No follow-ups on file.    Patient or patient representative verbalized consent for the use of Ambient Listening during the visit with  Yonatan Perales MD for chart documentation. 7/22/2025  15:43 EDT

## 2025-07-30 ENCOUNTER — TELEPHONE (OUTPATIENT)
Dept: FAMILY MEDICINE CLINIC | Facility: CLINIC | Age: 40
End: 2025-07-30
Payer: COMMERCIAL

## 2025-08-04 DIAGNOSIS — R29.898 POPPING OF LEFT KNEE JOINT: Primary | ICD-10-CM

## 2025-08-18 ENCOUNTER — OFFICE VISIT (OUTPATIENT)
Dept: ORTHOPEDIC SURGERY | Facility: CLINIC | Age: 40
End: 2025-08-18
Payer: COMMERCIAL

## 2025-08-18 VITALS — BODY MASS INDEX: 28.12 KG/M2 | HEIGHT: 66 IN | TEMPERATURE: 98.2 F | WEIGHT: 175 LBS

## 2025-08-18 DIAGNOSIS — M25.861 PATELLOFEMORAL DYSFUNCTION OF RIGHT KNEE: ICD-10-CM

## 2025-08-18 DIAGNOSIS — R52 PAIN: Primary | ICD-10-CM

## 2025-08-18 PROCEDURE — 99203 OFFICE O/P NEW LOW 30 MIN: CPT | Performed by: ORTHOPAEDIC SURGERY

## 2025-08-18 PROCEDURE — 73562 X-RAY EXAM OF KNEE 3: CPT | Performed by: ORTHOPAEDIC SURGERY
